# Patient Record
Sex: MALE | Race: BLACK OR AFRICAN AMERICAN | NOT HISPANIC OR LATINO | Employment: FULL TIME | URBAN - METROPOLITAN AREA
[De-identification: names, ages, dates, MRNs, and addresses within clinical notes are randomized per-mention and may not be internally consistent; named-entity substitution may affect disease eponyms.]

---

## 2019-05-27 ENCOUNTER — HOSPITAL ENCOUNTER (EMERGENCY)
Facility: HOSPITAL | Age: 49
Discharge: HOME | End: 2019-05-27
Attending: EMERGENCY MEDICINE
Payer: COMMERCIAL

## 2019-05-27 VITALS
HEIGHT: 72 IN | HEART RATE: 76 BPM | RESPIRATION RATE: 20 BRPM | BODY MASS INDEX: 33.86 KG/M2 | WEIGHT: 250 LBS | TEMPERATURE: 98 F | DIASTOLIC BLOOD PRESSURE: 92 MMHG | OXYGEN SATURATION: 98 % | SYSTOLIC BLOOD PRESSURE: 131 MMHG

## 2019-05-27 DIAGNOSIS — M54.9 RIGHT-SIDED BACK PAIN, UNSPECIFIED BACK LOCATION, UNSPECIFIED CHRONICITY: Primary | ICD-10-CM

## 2019-05-27 LAB
BILIRUB UR QL STRIP: NEGATIVE
CLARITY UR: CLEAR
COLOR UR: YELLOW
GLUCOSE UR QL STRIP: NEGATIVE
HGB UR QL STRIP: NEGATIVE
KETONES UR QL STRIP: NEGATIVE
LEUKOCYTE ESTERASE UR QL STRIP: NEGATIVE
NITRITE UR QL STRIP: NEGATIVE
PH UR STRIP: 6 [PH] (ref 5–8)
PROT UR QL STRIP: NEGATIVE
SP GR UR STRIP: 1.02 (ref 1–1.03)
URN SPEC COLLECT METH UR: NORMAL
UROBILINOGEN UR STRIP-ACNC: NEGATIVE EU/DL

## 2019-05-27 PROCEDURE — 25000003 PHARM REV CODE 250: Performed by: NURSE PRACTITIONER

## 2019-05-27 PROCEDURE — 99284 EMERGENCY DEPT VISIT MOD MDM: CPT

## 2019-05-27 PROCEDURE — 81003 URINALYSIS AUTO W/O SCOPE: CPT

## 2019-05-27 RX ORDER — METHOCARBAMOL 750 MG/1
1500 TABLET, FILM COATED ORAL
Status: COMPLETED | OUTPATIENT
Start: 2019-05-27 | End: 2019-05-27

## 2019-05-27 RX ORDER — KETOROLAC TROMETHAMINE 10 MG/1
10 TABLET, FILM COATED ORAL
Qty: 14 TABLET | Refills: 0 | Status: SHIPPED | OUTPATIENT
Start: 2019-05-27 | End: 2019-06-06

## 2019-05-27 RX ORDER — METHOCARBAMOL 500 MG/1
1000 TABLET, FILM COATED ORAL 3 TIMES DAILY
Qty: 30 TABLET | Refills: 0 | Status: SHIPPED | OUTPATIENT
Start: 2019-05-27 | End: 2019-06-01

## 2019-05-27 RX ORDER — MESALAMINE 1.2 G/1
1.2 TABLET, DELAYED RELEASE ORAL
COMMUNITY
End: 2022-06-03

## 2019-05-27 RX ORDER — KETOROLAC TROMETHAMINE 10 MG/1
10 TABLET, FILM COATED ORAL
Status: COMPLETED | OUTPATIENT
Start: 2019-05-27 | End: 2019-05-27

## 2019-05-27 RX ADMIN — KETOROLAC TROMETHAMINE 10 MG: 10 TABLET, FILM COATED ORAL at 10:05

## 2019-05-27 RX ADMIN — METHOCARBAMOL TABLETS 1500 MG: 750 TABLET, COATED ORAL at 10:05

## 2019-05-27 NOTE — DISCHARGE INSTRUCTIONS
Take prescribed medications as labeled as needed for pain.  Do not drive, drink alcohol, or operate machinery while taking Robaxin.  Follow up with [facility] in 2-3 days and return to ED for any concerns or worsening symptoms, such as fever, increased pain, or nonstop vomiting.

## 2019-05-27 NOTE — ED PROVIDER NOTES
Encounter Date: 5/27/2019       History     Chief Complaint   Patient presents with    Back Pain     48 year old male presents to ed cc of right back pain x 1 month patient denies fall or injury denies dysuria/ hematuria     Mingo Kahn 48 y.o.  with Past Medical History:  No date: Ulcerative (chronic) enterocolitis who presents to ED with intermittent low back pain x2 months that has worsened over the past 2 days.  Patient reports pain to the back on the right lower area. Denies any trauma or injury.  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  Denies any loss of bowel or bladder control.  No treatments tried.  Pt denies any alleviating factors. Denies fever, chills, neck pain/stiffness, weakness, numbness, tingling, dysuria, hematuria, or any other concerns at this time.     The history is provided by the patient.     Review of patient's allergies indicates:  No Known Allergies  Past Medical History:   Diagnosis Date    Ulcerative (chronic) enterocolitis      History reviewed. No pertinent surgical history.  History reviewed. No pertinent family history.  Social History     Tobacco Use    Smoking status: Current Every Day Smoker   Substance Use Topics    Alcohol use: Yes    Drug use: Not on file     Review of Systems   Constitutional: Negative for chills and fever.   Genitourinary: Negative for dysuria and hematuria.        Denies bowel/bladder incontinence.    Musculoskeletal: Positive for back pain. Negative for gait problem, neck pain and neck stiffness.   Neurological: Negative for weakness and numbness.   Hematological: Does not bruise/bleed easily.   All other systems reviewed and are negative.      Physical Exam     Initial Vitals [05/27/19 0939]   BP Pulse Resp Temp SpO2   (!) 131/92 76 20 98.4 °F (36.9 °C) 98 %      MAP       --         Physical Exam    Vitals reviewed.  Constitutional: He appears well-developed and well-nourished.  Non-toxic appearance. He  does not have a sickly appearance.   HENT:   Head: Atraumatic.   Eyes: EOM are normal.   Neck: Normal range of motion, full passive range of motion without pain and phonation normal. Neck supple.   Cardiovascular: Regular rhythm.   Pulmonary/Chest: No respiratory distress.   Musculoskeletal:        Back:    Non-tender midline CTL.  Sensation and strength intact in BLE.  Pain worse with forward flexion and extension.     Neurological: He is alert and oriented to person, place, and time. He has normal strength. No sensory deficit. Gait normal.   Ambulatory.   Skin: No rash noted.   Psychiatric: He has a normal mood and affect.         ED Course   Procedures  Labs Reviewed   URINALYSIS, REFLEX TO URINE CULTURE    Narrative:     Preferred Collection Type->Urine, Clean Catch          Imaging Results    None          Medical Decision Making:   History:   Old Medical Records: I decided to obtain old medical records.  Initial Assessment:   Pt presents to ED with  intermittent low back pain x2 months that has worsened over the past 2 days.  Appears well, non-toxic. Sensation and strength intact bilaterally in lower extremities.  Ambulatory.  No s/s of cauda equina. No mid-line tenderness upon exam, no need for imaging today. Will treat pain and reassess.   Clinical Tests:   Lab Tests: Reviewed and Ordered  The following lab test(s) were unremarkable: Urinalysis  ED Management:  UA, PO robaxin, toraol  UA shows no signs of infection or hematuria. No red flags on presentation or physical exam. Unlikely to be spinal stenosis as there are no neurologic findings, does not appear to be infectious given no fever, no point tenderness, coulg be DJD or disc herniation but xray normal and no red flags that would prompt any imaging. Likely musculoskeletal pain. I will d/c home with anti-inflammatories and muscle relaxer.  Narcotic precautions given.  Will instruct pt to follow up with PCP in 2-3 days if symptoms do not improve. We  disscused at length warning signs for return including but not limited to increased pain, change in gait, sensation changes around the rectum or perineum, bowel or bladder issues, fever and the pt understands. The pt is comfortable going home at this time. After taking into careful account the historical factors and physical exam findings of the patient's presentation today, in conjunction with the empirical and objective data obtained on ED workup, no acute emergent medical condition requiring admission has been identified. The patient appears to be low risk for an emergent medical condition and I feel it is safe and appropriate at this time for the patient to be discharged to follow-up as detailed in their discharge instructions for reevaluation and possible continued outpatient workup and management. I have discussed the specifics of the workup with the patient and the patient has verbalized understanding of the details of the workup, the diagnosis, the treatment plan, and the need for outpatient follow-up.  Although the patient has no emergent etiology today this does not preclude the development of an emergent condition so in addition, I have advised the patient that they can return to the ED and/or activate EMS at any time with worsening of their symptoms, change of their symptoms, or with any other medical complaint.  The patient remained comfortable and stable during their visit in the ED.  Discharge and follow-up instructions discussed with the patient who expressed understanding and willingness to comply with my recommendations.  Ambulatory referral to [facility] placed.     This medical record was prepared using voice dictation software. There may be phonetic errors.    Other:   I discussed test(s) with the performing physician.                   ED Course as of May 27 1512   Mon May 27, 2019   1031 10:31 AM- Patient reports improvement in pain after medications given in ED.      [CH]      ED  Course User Index  [CH] Romero Pinto NP     Clinical Impression:       ICD-10-CM ICD-9-CM   1. Right-sided back pain, unspecified back location, unspecified chronicity M54.9 724.5                                Romero Pinto NP  05/27/19 1516

## 2019-06-06 ENCOUNTER — OFFICE VISIT (OUTPATIENT)
Dept: FAMILY MEDICINE | Facility: CLINIC | Age: 49
End: 2019-06-06
Payer: COMMERCIAL

## 2019-06-06 VITALS
BODY MASS INDEX: 37.47 KG/M2 | SYSTOLIC BLOOD PRESSURE: 118 MMHG | HEART RATE: 62 BPM | HEIGHT: 72 IN | OXYGEN SATURATION: 98 % | WEIGHT: 276.69 LBS | DIASTOLIC BLOOD PRESSURE: 82 MMHG

## 2019-06-06 DIAGNOSIS — Z00.00 ANNUAL PHYSICAL EXAM: ICD-10-CM

## 2019-06-06 DIAGNOSIS — K51.80 OTHER ULCERATIVE COLITIS WITHOUT COMPLICATION: ICD-10-CM

## 2019-06-06 DIAGNOSIS — Z72.0 TOBACCO USE: ICD-10-CM

## 2019-06-06 DIAGNOSIS — M54.9 MID BACK PAIN ON RIGHT SIDE: Primary | ICD-10-CM

## 2019-06-06 PROBLEM — K51.90 ULCERATIVE COLITIS: Status: ACTIVE | Noted: 2018-06-21

## 2019-06-06 PROCEDURE — 99999 PR PBB SHADOW E&M-EST. PATIENT-LVL III: CPT | Mod: PBBFAC,,, | Performed by: FAMILY MEDICINE

## 2019-06-06 PROCEDURE — 90732 PNEUMOCOCCAL POLYSACCHARIDE VACCINE 23-VALENT =>2YO SQ IM: ICD-10-PCS | Mod: S$GLB,,, | Performed by: FAMILY MEDICINE

## 2019-06-06 PROCEDURE — 99204 PR OFFICE/OUTPT VISIT, NEW, LEVL IV, 45-59 MIN: ICD-10-PCS | Mod: 25,S$GLB,, | Performed by: FAMILY MEDICINE

## 2019-06-06 PROCEDURE — 90471 PNEUMOCOCCAL POLYSACCHARIDE VACCINE 23-VALENT =>2YO SQ IM: ICD-10-PCS | Mod: S$GLB,,, | Performed by: FAMILY MEDICINE

## 2019-06-06 PROCEDURE — 90471 IMMUNIZATION ADMIN: CPT | Mod: S$GLB,,, | Performed by: FAMILY MEDICINE

## 2019-06-06 PROCEDURE — 3008F PR BODY MASS INDEX (BMI) DOCUMENTED: ICD-10-PCS | Mod: CPTII,S$GLB,, | Performed by: FAMILY MEDICINE

## 2019-06-06 PROCEDURE — 3008F BODY MASS INDEX DOCD: CPT | Mod: CPTII,S$GLB,, | Performed by: FAMILY MEDICINE

## 2019-06-06 PROCEDURE — 90732 PPSV23 VACC 2 YRS+ SUBQ/IM: CPT | Mod: S$GLB,,, | Performed by: FAMILY MEDICINE

## 2019-06-06 PROCEDURE — 99999 PR PBB SHADOW E&M-EST. PATIENT-LVL III: ICD-10-PCS | Mod: PBBFAC,,, | Performed by: FAMILY MEDICINE

## 2019-06-06 PROCEDURE — 99204 OFFICE O/P NEW MOD 45 MIN: CPT | Mod: 25,S$GLB,, | Performed by: FAMILY MEDICINE

## 2019-06-06 NOTE — PROGRESS NOTES
Subjective:       Patient ID: Mingo Kahn is a 48 y.o. male.    Chief Complaint: Annual Exam and Establish Care    47 yo M pt, new to me, with PMH significant for ulcerative colitis presents for ED discharge f/u visit. He was evaluated on 5/27/19 with c/o right lower back pain--deemed to be MSK in nature. Tx'd with robaxin 1g BID + Ketorolac 10 mg TID with significant improvement in symptoms. He reports accidentally falling down stairs on 5/31/19 which worsened the pain. Denies fevers/chills, lower extremity radiculopathy, lower extremity sensory deficits, lower extremity weakness, bowel/bladder changes, weight loss, and skin changes to affected area. No OTC meds attempted.     Review of Systems   Constitutional: Negative for activity change, appetite change, chills, fever and unexpected weight change.   HENT: Negative for congestion, sneezing and sore throat.    Eyes: Negative for redness, itching and visual disturbance.   Respiratory: Negative for cough, shortness of breath and wheezing.    Cardiovascular: Negative for chest pain.   Gastrointestinal: Negative for abdominal pain, constipation, diarrhea, nausea and vomiting.   Genitourinary: Negative for difficulty urinating, discharge, dysuria, frequency and urgency.   Musculoskeletal: Positive for arthralgias and back pain.   Skin: Negative for rash.   Neurological: Negative for dizziness, syncope, weakness and headaches.   Psychiatric/Behavioral: Negative for dysphoric mood and suicidal ideas. The patient is not nervous/anxious.          Past Medical History:   Diagnosis Date    Diverticulitis     Ulcerative (chronic) enterocolitis        Past Surgical History:   Procedure Laterality Date    COLONOSCOPY W/ BIOPSIES  2015       Family History   Problem Relation Age of Onset    Hyperlipidemia Mother     Hypertension Mother     Cancer Father 71        Pancreatic Cancer    Stroke Maternal Grandmother     Heart attack Maternal Grandmother     Stomach  cancer Paternal Grandmother     Prostate cancer Maternal Uncle     Prostate cancer Maternal Uncle     Throat cancer Maternal Uncle     Stomach cancer Paternal Uncle     Breast cancer Paternal Aunt     Breast cancer Maternal Aunt        Social History     Tobacco Use   Smoking Status Current Every Day Smoker    Packs/day: 0.25    Years: 30.00    Pack years: 7.50       Social History     Social History Narrative    EtOH: 1-2 beers and 1-2 mixed drinks on weekends     Drug: None    Employment: Construction; industrial pelaez    Education: Some college (2 years)     ; Lives with wife     3 children        Objective:        Vitals:    06/06/19 1428   BP: 118/82   Pulse: 62   SpO2: 98%   Weight: 125.5 kg (276 lb 10.8 oz)   Height: 6' (1.829 m)       Physical Exam   Constitutional: He is oriented to person, place, and time. No distress.   Obeset; BMI 37.52   HENT:   Head: Normocephalic and atraumatic.   Right Ear: External ear normal.   Left Ear: External ear normal.   Mouth/Throat: Oropharynx is clear and moist and mucous membranes are normal.   Eyes: Conjunctivae and EOM are normal. No scleral icterus.   Neck: Normal range of motion.   Cardiovascular: Normal rate, regular rhythm and normal heart sounds. Exam reveals no gallop and no friction rub.   No murmur heard.  Pulmonary/Chest: Effort normal and breath sounds normal. No respiratory distress. He has no wheezes. He has no rales.   Musculoskeletal: Normal range of motion. He exhibits no edema, tenderness or deformity.        Back:    SLR negative bilaterally. FROM of spine on flexion/extension/twisting/tilting. + discomfort on twisting/tilting.    Neurological: He is alert and oriented to person, place, and time. No cranial nerve deficit. He exhibits normal muscle tone. Gait normal.   Skin: Skin is warm and dry. No rash noted. No erythema.   Psychiatric: He has a normal mood and affect. His behavior is normal.       Assessment:       1. Mid back pain  on right side    2. Chronic ulcerative enterocolitis without complication    3. Tobacco use    4. Annual physical exam        Plan:       Mingo was seen today for annual exam and establish care.    Diagnoses and all orders for this visit:    Mid back pain on right side    Chronic ulcerative enterocolitis without complication    Tobacco use  -     (In Office Administered) Pneumococcal Polysaccharide Vaccine (23 Valent) (SQ/IM)    Annual physical exam  -     CBC auto differential; Future  -     Comprehensive metabolic panel; Future  -     Hemoglobin A1c; Future  -     TSH; Future  -     Lipid panel; Future  -     HIV 1/2 Ag/Ab (4th Gen); Future  -     Vitamin D; Future    Mid-back pain; right   Muscular in nature. Mild to moderate in intensity   Recommend OTC NSAID/Tylenol prn  Warm compress prn  Massage prn  AAOS handout on spine conditioning provided  Advised activity as tolerated   Encouraged routine physical activity and abdominal core strengthening    Ulcerative Colitis   Dx'd 2015 after presenting with rectal bleeding   Followed by GI at [facility]   He is not adherent with Mesalamine.   Currently asymptomatic. Will continue to monitor  Due for repeat colonoscopy in 2020    Tobacco use   Precontemplative stage of quitting   Pneumovax-23 today   Discuss referral to tobacco cessation program on f/u visit       Tdap administered 6/13/17  PPSV-23 administered today 6/6/19    Anticipate f/u in 1-2 weeks for annual wellness visit. Plan for labs prior to visit   Patient also did express that he experiences intermittent shoulder pain. Will address this in further detail follow-up.

## 2019-06-15 ENCOUNTER — LAB VISIT (OUTPATIENT)
Dept: LAB | Facility: HOSPITAL | Age: 49
End: 2019-06-15
Attending: FAMILY MEDICINE
Payer: COMMERCIAL

## 2019-06-15 DIAGNOSIS — Z00.00 ANNUAL PHYSICAL EXAM: ICD-10-CM

## 2019-06-15 LAB
25(OH)D3+25(OH)D2 SERPL-MCNC: 14 NG/ML (ref 30–96)
ALBUMIN SERPL BCP-MCNC: 3.8 G/DL (ref 3.5–5.2)
ALP SERPL-CCNC: 104 U/L (ref 55–135)
ALT SERPL W/O P-5'-P-CCNC: 21 U/L (ref 10–44)
ANION GAP SERPL CALC-SCNC: 7 MMOL/L (ref 8–16)
AST SERPL-CCNC: 22 U/L (ref 10–40)
BASOPHILS # BLD AUTO: 0.03 K/UL (ref 0–0.2)
BASOPHILS NFR BLD: 0.7 % (ref 0–1.9)
BILIRUB SERPL-MCNC: 0.6 MG/DL (ref 0.1–1)
BUN SERPL-MCNC: 12 MG/DL (ref 6–20)
CALCIUM SERPL-MCNC: 9.5 MG/DL (ref 8.7–10.5)
CHLORIDE SERPL-SCNC: 108 MMOL/L (ref 95–110)
CHOLEST SERPL-MCNC: 133 MG/DL (ref 120–199)
CHOLEST/HDLC SERPL: 4.4 {RATIO} (ref 2–5)
CO2 SERPL-SCNC: 27 MMOL/L (ref 23–29)
CREAT SERPL-MCNC: 1.1 MG/DL (ref 0.5–1.4)
DIFFERENTIAL METHOD: ABNORMAL
EOSINOPHIL # BLD AUTO: 0.2 K/UL (ref 0–0.5)
EOSINOPHIL NFR BLD: 5.3 % (ref 0–8)
ERYTHROCYTE [DISTWIDTH] IN BLOOD BY AUTOMATED COUNT: 13.5 % (ref 11.5–14.5)
EST. GFR  (AFRICAN AMERICAN): >60 ML/MIN/1.73 M^2
EST. GFR  (NON AFRICAN AMERICAN): >60 ML/MIN/1.73 M^2
ESTIMATED AVG GLUCOSE: 134 MG/DL (ref 68–131)
GLUCOSE SERPL-MCNC: 104 MG/DL (ref 70–110)
HBA1C MFR BLD HPLC: 6.3 % (ref 4–5.6)
HCT VFR BLD AUTO: 47.5 % (ref 40–54)
HDLC SERPL-MCNC: 30 MG/DL (ref 40–75)
HDLC SERPL: 22.6 % (ref 20–50)
HGB BLD-MCNC: 15.1 G/DL (ref 14–18)
LDLC SERPL CALC-MCNC: 92.2 MG/DL (ref 63–159)
LYMPHOCYTES # BLD AUTO: 2.2 K/UL (ref 1–4.8)
LYMPHOCYTES NFR BLD: 49.9 % (ref 18–48)
MCH RBC QN AUTO: 26.9 PG (ref 27–31)
MCHC RBC AUTO-ENTMCNC: 31.8 G/DL (ref 32–36)
MCV RBC AUTO: 85 FL (ref 82–98)
MONOCYTES # BLD AUTO: 0.3 K/UL (ref 0.3–1)
MONOCYTES NFR BLD: 6.6 % (ref 4–15)
NEUTROPHILS # BLD AUTO: 1.6 K/UL (ref 1.8–7.7)
NEUTROPHILS NFR BLD: 37.5 % (ref 38–73)
NONHDLC SERPL-MCNC: 103 MG/DL
PLATELET # BLD AUTO: 208 K/UL (ref 150–350)
PMV BLD AUTO: 10.2 FL (ref 9.2–12.9)
POTASSIUM SERPL-SCNC: 4.5 MMOL/L (ref 3.5–5.1)
PROT SERPL-MCNC: 7.1 G/DL (ref 6–8.4)
RBC # BLD AUTO: 5.62 M/UL (ref 4.6–6.2)
SODIUM SERPL-SCNC: 142 MMOL/L (ref 136–145)
TRIGL SERPL-MCNC: 54 MG/DL (ref 30–150)
TSH SERPL DL<=0.005 MIU/L-ACNC: 0.56 UIU/ML (ref 0.4–4)
WBC # BLD AUTO: 4.37 K/UL (ref 3.9–12.7)

## 2019-06-15 PROCEDURE — 84443 ASSAY THYROID STIM HORMONE: CPT

## 2019-06-15 PROCEDURE — 82306 VITAMIN D 25 HYDROXY: CPT

## 2019-06-15 PROCEDURE — 80061 LIPID PANEL: CPT

## 2019-06-15 PROCEDURE — 36415 COLL VENOUS BLD VENIPUNCTURE: CPT

## 2019-06-15 PROCEDURE — 80053 COMPREHEN METABOLIC PANEL: CPT

## 2019-06-15 PROCEDURE — 86703 HIV-1/HIV-2 1 RESULT ANTBDY: CPT

## 2019-06-15 PROCEDURE — 83036 HEMOGLOBIN GLYCOSYLATED A1C: CPT

## 2019-06-15 PROCEDURE — 85025 COMPLETE CBC W/AUTO DIFF WBC: CPT

## 2019-06-17 LAB — HIV 1+2 AB+HIV1 P24 AG SERPL QL IA: NEGATIVE

## 2019-06-20 ENCOUNTER — TELEPHONE (OUTPATIENT)
Dept: FAMILY MEDICINE | Facility: CLINIC | Age: 49
End: 2019-06-20

## 2019-06-20 ENCOUNTER — LAB VISIT (OUTPATIENT)
Dept: LAB | Facility: HOSPITAL | Age: 49
End: 2019-06-20
Attending: FAMILY MEDICINE
Payer: COMMERCIAL

## 2019-06-20 ENCOUNTER — OFFICE VISIT (OUTPATIENT)
Dept: FAMILY MEDICINE | Facility: CLINIC | Age: 49
End: 2019-06-20
Payer: COMMERCIAL

## 2019-06-20 VITALS
OXYGEN SATURATION: 98 % | HEIGHT: 72 IN | WEIGHT: 271.38 LBS | DIASTOLIC BLOOD PRESSURE: 72 MMHG | SYSTOLIC BLOOD PRESSURE: 114 MMHG | BODY MASS INDEX: 36.76 KG/M2 | HEART RATE: 73 BPM

## 2019-06-20 DIAGNOSIS — E55.9 VITAMIN D DEFICIENCY: ICD-10-CM

## 2019-06-20 DIAGNOSIS — F17.200 TOBACCO DEPENDENCE: ICD-10-CM

## 2019-06-20 DIAGNOSIS — K51.80 OTHER ULCERATIVE COLITIS WITHOUT COMPLICATION: ICD-10-CM

## 2019-06-20 DIAGNOSIS — E78.6 LOW HDL (UNDER 40): ICD-10-CM

## 2019-06-20 DIAGNOSIS — M75.80 ROTATOR CUFF TENDINITIS, UNSPECIFIED LATERALITY: ICD-10-CM

## 2019-06-20 DIAGNOSIS — Z12.5 SCREENING PSA (PROSTATE SPECIFIC ANTIGEN): ICD-10-CM

## 2019-06-20 DIAGNOSIS — R73.03 PREDIABETES: ICD-10-CM

## 2019-06-20 DIAGNOSIS — Z00.00 ANNUAL PHYSICAL EXAM: Primary | ICD-10-CM

## 2019-06-20 LAB — COMPLEXED PSA SERPL-MCNC: 0.5 NG/ML (ref 0–4)

## 2019-06-20 PROCEDURE — 36415 COLL VENOUS BLD VENIPUNCTURE: CPT

## 2019-06-20 PROCEDURE — 84153 ASSAY OF PSA TOTAL: CPT

## 2019-06-20 PROCEDURE — 99999 PR PBB SHADOW E&M-EST. PATIENT-LVL III: ICD-10-PCS | Mod: PBBFAC,,, | Performed by: FAMILY MEDICINE

## 2019-06-20 PROCEDURE — 99999 PR PBB SHADOW E&M-EST. PATIENT-LVL III: CPT | Mod: PBBFAC,,, | Performed by: FAMILY MEDICINE

## 2019-06-20 PROCEDURE — 99396 PREV VISIT EST AGE 40-64: CPT | Mod: S$GLB,,, | Performed by: FAMILY MEDICINE

## 2019-06-20 PROCEDURE — 99396 PR PREVENTIVE VISIT,EST,40-64: ICD-10-PCS | Mod: S$GLB,,, | Performed by: FAMILY MEDICINE

## 2019-06-20 RX ORDER — ERGOCALCIFEROL 1.25 MG/1
50000 CAPSULE ORAL
Qty: 12 CAPSULE | Refills: 0 | Status: SHIPPED | OUTPATIENT
Start: 2019-06-20 | End: 2019-09-06

## 2019-06-20 NOTE — PROGRESS NOTES
Subjective:       Patient ID: Mingo Kahn is a 48 y.o. male.    Chief Complaint: Annual Exam    49 yo M, recently established pt of mine, with PMH significant for ulcerative colitis presents for annual physical exam.    BP: 114/72  BMI: 36.81  Diet/Lifestyle: First meal as at 11:30am--Turkey [place]; 7:30/8:00pm--Baked [place] vs Pork Chop. Rare Fruits/Vegetables. Started exercising 2 weeks ago-- push-ups/sit-ups/free weights  Last Eye Exam: 6/2018/wears rx'd glasses  Last Dental Exam: Several years ago   Last Colonoscopy: 2015; Dr. Eben Richmond ([facility]); planning for repeat 1/2019. He does have UC that is in remission without use of medication.   Last PSA: Has not had  Last Flu Vaccine: N/A  Last Tdap Vaccine: 6/3/17    Labs drawn 6/15/19:   A1c 6.3  , TG 54, HDL 30, LDL 92.2; 10 yr ASCVD risk 11.10%; would be 5.93% if no tobacco use.   Vitamin D 14  HIV NR  CBC wnl   CMP wnl  TSH 0.559    Review of Systems   Constitutional: Negative for activity change, appetite change, chills, fever and unexpected weight change.   HENT: Negative for congestion, sneezing and sore throat.    Eyes: Negative for redness, itching and visual disturbance.   Respiratory: Negative for cough, shortness of breath and wheezing.    Cardiovascular: Negative for chest pain.   Gastrointestinal: Negative for anal bleeding, constipation, diarrhea, nausea and vomiting.   Genitourinary: Negative for difficulty urinating, discharge, dysuria, frequency and urgency.   Musculoskeletal: Positive for arthralgias (bilateral shoulders).   Skin: Negative for rash.   Neurological: Negative for dizziness, syncope, weakness and headaches.   Psychiatric/Behavioral: Negative for dysphoric mood and suicidal ideas. The patient is not nervous/anxious.          Past Medical History:   Diagnosis Date    Diverticulitis     Ulcerative (chronic) enterocolitis        Patient Active Problem List   Diagnosis    Ulcerative colitis    BMI 35.0-35.9,adult     Vitamin D deficiency    Prediabetes    Low HDL (under 40)    Tobacco dependence    Rotator cuff tendinitis       Past Surgical History:   Procedure Laterality Date    COLONOSCOPY W/ BIOPSIES  2015       Family History   Problem Relation Age of Onset    Hyperlipidemia Mother     Hypertension Mother     Cancer Father 71        Pancreatic Cancer    Stroke Maternal Grandmother     Heart attack Maternal Grandmother     Stomach cancer Paternal Grandmother     Prostate cancer Maternal Uncle     Prostate cancer Maternal Uncle     Throat cancer Maternal Uncle     Stomach cancer Paternal Uncle     Breast cancer Paternal Aunt     Breast cancer Maternal Aunt        Social History     Tobacco Use   Smoking Status Current Every Day Smoker    Packs/day: 0.25    Years: 30.00    Pack years: 7.50       Social History     Social History Narrative    EtOH: 1-2 beers and 1-2 mixed drinks on weekends     Drug: None    Employment: Construction; industrial pelaez    Education: Some college (2 years)     ; Lives with wife     3 children        Objective:        Vitals:    06/20/19 0748   BP: 114/72   Pulse: 73   SpO2: 98%   Weight: 123.1 kg (271 lb 6.2 oz)   Height: 6' (1.829 m)       Physical Exam   Constitutional: He is oriented to person, place, and time. He appears well-developed and well-nourished. No distress.   HENT:   Head: Normocephalic and atraumatic.   Right Ear: Tympanic membrane, external ear and ear canal normal.   Left Ear: Tympanic membrane, external ear and ear canal normal.   Nose: Nose normal. No mucosal edema or rhinorrhea. Right sinus exhibits no maxillary sinus tenderness and no frontal sinus tenderness. Left sinus exhibits no maxillary sinus tenderness and no frontal sinus tenderness.   Mouth/Throat: Oropharynx is clear and moist and mucous membranes are normal. No oropharyngeal exudate.   Eyes: Conjunctivae and EOM are normal. No scleral icterus.   Neck: Normal range of motion. Neck  supple. No thyromegaly present.   Cardiovascular: Normal rate, regular rhythm and normal heart sounds. Exam reveals no gallop and no friction rub.   No murmur heard.  Pulses:       Dorsalis pedis pulses are 2+ on the right side, and 2+ on the left side.   Pulmonary/Chest: Effort normal and breath sounds normal. He has no wheezes. He has no rales.   Abdominal: Soft. Bowel sounds are normal. He exhibits no distension and no mass. There is no tenderness.   Musculoskeletal: Normal range of motion. He exhibits no edema or deformity.   Lymphadenopathy:     He has no cervical adenopathy.   Neurological: He is alert and oriented to person, place, and time. He has normal strength. No cranial nerve deficit or sensory deficit.   Reflex Scores:       Patellar reflexes are 2+ on the right side and 2+ on the left side.  Skin: Skin is warm and dry. No rash noted. No erythema.   Psychiatric: He has a normal mood and affect. His behavior is normal.         Assessment:       1. Annual physical exam    2. BMI 35.0-35.9,adult    3. Prediabetes    4. Low HDL (under 40)    5. Vitamin D deficiency    6. Other ulcerative colitis without complication    7. Tobacco dependence    8. Rotator cuff tendinitis, unspecified laterality    9. Screening PSA (prostate specific antigen)        Plan:       Mingo was seen today for annual exam.    Diagnoses and all orders for this visit:    Annual physical exam    BMI 35.0-35.9,adult    Prediabetes    Low HDL (under 40)    Vitamin D deficiency  -     ergocalciferol (ERGOCALCIFEROL) 50,000 unit Cap; Take 1 capsule (50,000 Units total) by mouth every 7 days. for 12 doses    Other ulcerative colitis without complication    Tobacco dependence    Rotator cuff tendinitis, unspecified laterality    Screening PSA (prostate specific antigen)  -     PSA, Screening; Future      Annual Exam  BP normotensive  BMI: 36.81--see below  Encouraged annual eye exams and adherence with rx'd lenses. Encouraged dental exams  q6 months  PSA today given family history of prostate cancer (2 maternal uncles dx'd at 59 yo)  Last Flu Vaccine: N/A  Last Tdap Vaccine: 6/3/17  HIV NR 6/15/19  CBC wnl 6/15/19  CMP wnl 6/15/19  TSH 0.559 6/15/19    Morbidly Obese   Advised at least 30 min of CV exercise 5 days a week. Encouraged plant-based diet. Advised small/frequent meals.  Also advised avoidance of sweetened beverages, limit portion sizes, and discussed healthy carbohydrate options (brown rice, 100% whole wheat bread, wheat pasta)      Abnormal Glc  A1c 6.3 6/15/19  Discussed healthy diet/lifestyle modifications as above; weight loss encouraged    Low HDL   , TG 54, HDL 30, LDL 92.2 6/15/19; 10 yr ASCVD risk 11.10%; would be 5.93% if no tobacco use  Advised tobacco cessation.     Vitamin D deficiency  Vitamin D 14 6/15/19  Will replete with ergocalciferol 50K U qweek x 12 weeks   Repeat Vitamin D in 3 months    UC   Currently in remission--untreated   Last Colonoscopy: 2015; Dr. Eben Richmond ([facility]); planning for repeat 1/2019    Tobacco Use   Currently working to cut down on his own   Declines referral for tobacco cessation today  Pneumovax-23 administered 6/6/2019    At the end of the visit pt wanted to address intermittent bilateral shoulder pain x several months. Reports shoulders give out on him with abduction. Exam notable for Full PROM and Full AROM. + empty can test and Hawkin's test bilaterally. 5/5 strength equal and intact to BUEs. Symptoms and complaint c/w rotator cuff tendinopathy. Pt has opted for home strengthening exercises of shoulder and rotator cuff as opposed to PT. Advised OTC tylenol/nsaid prn.      Follow up in about 3 months (around 9/20/2019).

## 2019-06-20 NOTE — TELEPHONE ENCOUNTER
----- Message from Natalie Price sent at 6/20/2019  2:09 PM CDT -----  Contact: [phone]/self  Patient called in returning your call. Please advise.

## 2019-06-20 NOTE — TELEPHONE ENCOUNTER
Called patient to notify that his prostate testing was normal. This should be repeated in 1 year.  No answer, left voicemail.

## 2019-09-24 ENCOUNTER — OFFICE VISIT (OUTPATIENT)
Dept: FAMILY MEDICINE | Facility: CLINIC | Age: 49
End: 2019-09-24
Payer: COMMERCIAL

## 2019-09-24 ENCOUNTER — LAB VISIT (OUTPATIENT)
Dept: LAB | Facility: HOSPITAL | Age: 49
End: 2019-09-24
Attending: FAMILY MEDICINE
Payer: COMMERCIAL

## 2019-09-24 VITALS
SYSTOLIC BLOOD PRESSURE: 124 MMHG | HEART RATE: 82 BPM | DIASTOLIC BLOOD PRESSURE: 76 MMHG | BODY MASS INDEX: 36.37 KG/M2 | OXYGEN SATURATION: 98 % | HEIGHT: 72 IN | WEIGHT: 268.5 LBS

## 2019-09-24 DIAGNOSIS — E78.6 LOW HDL (UNDER 40): ICD-10-CM

## 2019-09-24 DIAGNOSIS — R73.03 PREDIABETES: ICD-10-CM

## 2019-09-24 DIAGNOSIS — E55.9 VITAMIN D DEFICIENCY: Primary | ICD-10-CM

## 2019-09-24 DIAGNOSIS — E55.9 VITAMIN D DEFICIENCY: ICD-10-CM

## 2019-09-24 DIAGNOSIS — K51.80 OTHER ULCERATIVE COLITIS WITHOUT COMPLICATION: ICD-10-CM

## 2019-09-24 DIAGNOSIS — F17.200 TOBACCO DEPENDENCE: ICD-10-CM

## 2019-09-24 DIAGNOSIS — Z00.00 ANNUAL PHYSICAL EXAM: ICD-10-CM

## 2019-09-24 PROBLEM — M75.80 ROTATOR CUFF TENDINITIS: Status: RESOLVED | Noted: 2019-06-20 | Resolved: 2019-09-24

## 2019-09-24 LAB — 25(OH)D3+25(OH)D2 SERPL-MCNC: 19 NG/ML (ref 30–96)

## 2019-09-24 PROCEDURE — 99214 PR OFFICE/OUTPT VISIT, EST, LEVL IV, 30-39 MIN: ICD-10-PCS | Mod: S$GLB,,, | Performed by: FAMILY MEDICINE

## 2019-09-24 PROCEDURE — 99999 PR PBB SHADOW E&M-EST. PATIENT-LVL III: ICD-10-PCS | Mod: PBBFAC,,, | Performed by: FAMILY MEDICINE

## 2019-09-24 PROCEDURE — 3008F BODY MASS INDEX DOCD: CPT | Mod: CPTII,S$GLB,, | Performed by: FAMILY MEDICINE

## 2019-09-24 PROCEDURE — 99999 PR PBB SHADOW E&M-EST. PATIENT-LVL III: CPT | Mod: PBBFAC,,, | Performed by: FAMILY MEDICINE

## 2019-09-24 PROCEDURE — 82306 VITAMIN D 25 HYDROXY: CPT

## 2019-09-24 PROCEDURE — 99214 OFFICE O/P EST MOD 30 MIN: CPT | Mod: S$GLB,,, | Performed by: FAMILY MEDICINE

## 2019-09-24 PROCEDURE — 36415 COLL VENOUS BLD VENIPUNCTURE: CPT

## 2019-09-24 PROCEDURE — 3008F PR BODY MASS INDEX (BMI) DOCUMENTED: ICD-10-PCS | Mod: CPTII,S$GLB,, | Performed by: FAMILY MEDICINE

## 2019-09-24 RX ORDER — VIT C/E/ZN/COPPR/LUTEIN/ZEAXAN 250MG-90MG
1000 CAPSULE ORAL DAILY
Refills: 0 | COMMUNITY
Start: 2019-09-24 | End: 2022-03-23 | Stop reason: ALTCHOICE

## 2019-09-24 NOTE — PROGRESS NOTES
Subjective:       Patient ID: Mingo Kahn is a 49 y.o. male.    Chief Complaint: Follow-up    47 yo M, recently established pt of mine, with PMH significant for ulcerative colitis, Vitamin D deficiency, and Tobacco use. He presents for a follow-up office visit.     -He completed a 12 week course of high dose Vitamin D after Vitamin D level noted to be 14 6/2019. He has not been taking OTC Vitamin D since completion of high dose tabs.     -He is now smoking about 1-2 cigarettes daily (down from 5-6 cigarettes daily).     -He has lost 3 lbs over the past 3 months.     -He is no longer experiencing bilateral shoulder pain after performing home shoulder conditioning exercises.      -He did have a f/u visit with GI (Dr. Richmond) on 8/26/19. States GI was attempting to get previous lab results.     Review of Systems   Constitutional: Negative for activity change, appetite change, chills, fever and unexpected weight change.   HENT: Negative for congestion, sneezing and sore throat.    Eyes: Negative for redness, itching and visual disturbance.   Respiratory: Negative for cough, shortness of breath and wheezing.    Cardiovascular: Negative for chest pain.   Gastrointestinal: Negative for abdominal pain, constipation, diarrhea, nausea and vomiting.   Genitourinary: Negative for difficulty urinating, discharge, dysuria, frequency and urgency.   Skin: Negative for rash.   Neurological: Negative for dizziness, syncope, weakness and headaches.   Psychiatric/Behavioral: Negative for dysphoric mood and suicidal ideas. The patient is not nervous/anxious.          Past Medical History:   Diagnosis Date    Diverticulitis     Ulcerative (chronic) enterocolitis        Patient Active Problem List   Diagnosis    Ulcerative colitis    BMI 36.0-36.9,adult    Vitamin D deficiency    Prediabetes    Low HDL (under 40)    Tobacco dependence       Past Surgical History:   Procedure Laterality Date    COLONOSCOPY W/ BIOPSIES  2015        Family History   Problem Relation Age of Onset    Hyperlipidemia Mother     Hypertension Mother     Cancer Father 71        Pancreatic Cancer    Stroke Maternal Grandmother     Heart attack Maternal Grandmother     Stomach cancer Paternal Grandmother     Prostate cancer Maternal Uncle     Prostate cancer Maternal Uncle     Throat cancer Maternal Uncle     Stomach cancer Paternal Uncle     Breast cancer Paternal Aunt     Breast cancer Maternal Aunt        Social History     Tobacco Use   Smoking Status Current Every Day Smoker    Packs/day: 0.25    Years: 30.00    Pack years: 7.50       Social History     Social History Narrative    EtOH: 1-2 beers and 1-2 mixed drinks on weekends     Drug: None    Employment: Construction; industrial [employer]    Education: Some college (2 years)     ; Lives with wife     3 children        Objective:        Vitals:    09/24/19 0900   BP: 124/76   Pulse: 82   SpO2: 98%   Weight: 121.8 kg (268 lb 8.3 oz)   Height: 6' (1.829 m)       Physical Exam   Constitutional: He is oriented to person, place, and time. He appears well-developed and well-nourished. No distress.   HENT:   Head: Normocephalic and atraumatic.   Right Ear: External Ear normal   Left Ear: External Ear normal.   Nose: Nose normal. No mucosal edema or rhinorrhea. Right sinus exhibits no maxillary sinus tenderness and no frontal sinus tenderness. Left sinus exhibits no maxillary sinus tenderness and no frontal sinus tenderness.   Mouth/Throat: Oropharynx is clear and moist and mucous membranes are normal. No oropharyngeal exudate.   Eyes: Conjunctivae and EOM are normal. No scleral icterus.   Neck: Normal range of motion. Neck supple. No thyromegaly present.   Cardiovascular: Normal rate, regular rhythm and normal heart sounds. Exam reveals no gallop and no friction rub.   No murmur heard.  Pulmonary/Chest: Effort normal and breath sounds normal. He has no wheezes. He has no rales.    Musculoskeletal: Normal range of motion. He exhibits no edema or deformity.   Neurological: He is alert and oriented to person, place, and time.   Skin: Skin is warm and dry. No rash noted. No erythema.   Psychiatric: He has a normal mood and affect. His behavior is normal.     Assessment:       1. Vitamin D deficiency    2. Tobacco dependence    3. Other ulcerative colitis without complication    4. BMI 36.0-36.9,adult    5. Prediabetes    6. Low HDL (under 40)    7. Annual physical exam        Plan:       Mingo was seen today for follow-up.    Diagnoses and all orders for this visit:    Vitamin D deficiency  -     Vitamin D; Future  -     cholecalciferol, vitamin D3, (VITAMIN D3) 1,000 unit capsule; Take 1 capsule (1,000 Units total) by mouth once daily.    Tobacco dependence    Other ulcerative colitis without complication    BMI 36.0-36.9,adult    Prediabetes    Low HDL (under 40)    Annual physical exam  -     CBC auto differential; Future  -     Comprehensive metabolic panel; Future  -     Hemoglobin A1c; Future  -     Lipid panel; Future  -     Cancel: PSA, Screening; Future  -     PSA, Screening; Future      Vitamin D deficiency  Vitamin D 14 6/15/19  Completed ergocalciferol 50K U qweek x 12 weeks   Repeat Vitamin D today   Advised OTC Vitamin D3 1000 IU daily     Tobacco Use   Currently working to cut down on his own (now at 1-2 cigs daily)   Previously declined referral for tobacco cessation   Pneumovax-23 administered 6/6/2019    Shoulder Pain  --most c/w bilateral rotator cuff tendinopathy based on previous exam  Resolved with home strengthening exercises of shoulder and rotator cuff.     UC   Currently in remission--untreated   Most recent GI visit 8/26/19 at [facility]  Last Colonoscopy: 2015; Dr. Eben Richmond ([facility]); planning for repeat 1/2019  Labs printed for pt      Morbidly Obese   BMI 36.42  Advised at least 30 min of CV exercise 5 days a week. Encouraged plant-based diet. Advised small/frequent  meals.  Also advised avoidance of sweetened beverages, limit portion sizes, and discussed healthy carbohydrate options (brown rice, 100% whole wheat bread, wheat pasta)      Abnormal Glc  A1c 6.3 6/15/19  Discussed healthy diet/lifestyle modifications as above; weight loss encouraged    Low HDL   , TG 54, HDL 30, LDL 92.2 6/15/19; 10 yr ASCVD risk 11.10%; would be 5.93% if no tobacco use  Advised tobacco cessation.        HM  BP normotensive  BMI: 36.42  Encouraged annual eye exams and adherence with rx'd lenses. Encouraged dental exams q6 months  PSA 0.50 6/20/19. Plan for annual screen given family history of prostate cancer (2 maternal uncles dx'd at 59 yo)  Flu vaccine declined today  Last Tdap Vaccine: 6/3/17  HIV NR 6/15/19  CBC wnl 6/15/19  CMP wnl 6/15/19  TSH 0.559 6/15/19    Follow up in about 8 months (around 6/1/2020).

## 2019-09-29 ENCOUNTER — TELEPHONE (OUTPATIENT)
Dept: FAMILY MEDICINE | Facility: CLINIC | Age: 49
End: 2019-09-29

## 2019-09-30 ENCOUNTER — TELEPHONE (OUTPATIENT)
Dept: FAMILY MEDICINE | Facility: CLINIC | Age: 49
End: 2019-09-30

## 2019-09-30 NOTE — TELEPHONE ENCOUNTER
Left message requesting a callback to review the following:      Please let patient know that Vitamin D level is still a little low at 19. I would actually like for him to take Vitamin D3 4824-7990 units once daily.

## 2020-11-20 LAB — HM COLONOSCOPY: NORMAL

## 2021-06-23 ENCOUNTER — PATIENT OUTREACH (OUTPATIENT)
Dept: ADMINISTRATIVE | Facility: HOSPITAL | Age: 51
End: 2021-06-23

## 2021-08-25 DIAGNOSIS — Z11.59 NEED FOR HEPATITIS C SCREENING TEST: ICD-10-CM

## 2022-02-04 ENCOUNTER — PATIENT OUTREACH (OUTPATIENT)
Dept: ADMINISTRATIVE | Facility: HOSPITAL | Age: 52
End: 2022-02-04
Payer: COMMERCIAL

## 2022-03-21 ENCOUNTER — LAB VISIT (OUTPATIENT)
Dept: PRIMARY CARE CLINIC | Facility: CLINIC | Age: 52
End: 2022-03-21
Payer: COMMERCIAL

## 2022-03-21 ENCOUNTER — OFFICE VISIT (OUTPATIENT)
Dept: PRIMARY CARE CLINIC | Facility: CLINIC | Age: 52
End: 2022-03-21
Payer: COMMERCIAL

## 2022-03-21 VITALS
RESPIRATION RATE: 18 BRPM | SYSTOLIC BLOOD PRESSURE: 110 MMHG | HEART RATE: 82 BPM | DIASTOLIC BLOOD PRESSURE: 69 MMHG | BODY MASS INDEX: 38.54 KG/M2 | HEIGHT: 72 IN | TEMPERATURE: 98 F | WEIGHT: 284.5 LBS | OXYGEN SATURATION: 98 %

## 2022-03-21 DIAGNOSIS — G89.29 CHRONIC RIGHT-SIDED THORACIC BACK PAIN: Primary | ICD-10-CM

## 2022-03-21 DIAGNOSIS — E55.9 VITAMIN D DEFICIENCY: ICD-10-CM

## 2022-03-21 DIAGNOSIS — E78.6 LOW HDL (UNDER 40): ICD-10-CM

## 2022-03-21 DIAGNOSIS — Z23 NEED FOR SHINGLES VACCINE: ICD-10-CM

## 2022-03-21 DIAGNOSIS — E66.9 CLASS 2 OBESITY WITHOUT SERIOUS COMORBIDITY WITH BODY MASS INDEX (BMI) OF 38.0 TO 38.9 IN ADULT, UNSPECIFIED OBESITY TYPE: ICD-10-CM

## 2022-03-21 DIAGNOSIS — M54.6 CHRONIC RIGHT-SIDED THORACIC BACK PAIN: Primary | ICD-10-CM

## 2022-03-21 DIAGNOSIS — F17.200 TOBACCO DEPENDENCE: ICD-10-CM

## 2022-03-21 DIAGNOSIS — R73.03 PREDIABETES: ICD-10-CM

## 2022-03-21 DIAGNOSIS — Z00.00 ANNUAL PHYSICAL EXAM: ICD-10-CM

## 2022-03-21 DIAGNOSIS — K51.80 OTHER ULCERATIVE COLITIS WITHOUT COMPLICATION: ICD-10-CM

## 2022-03-21 DIAGNOSIS — R10.11 RUQ PAIN: ICD-10-CM

## 2022-03-21 PROBLEM — E66.812 CLASS 2 OBESITY WITHOUT SERIOUS COMORBIDITY WITH BODY MASS INDEX (BMI) OF 38.0 TO 38.9 IN ADULT: Status: ACTIVE | Noted: 2022-03-21

## 2022-03-21 LAB
25(OH)D3+25(OH)D2 SERPL-MCNC: 10 NG/ML (ref 30–96)
ALBUMIN SERPL BCP-MCNC: 3.7 G/DL (ref 3.5–5.2)
ALP SERPL-CCNC: 129 U/L (ref 55–135)
ALT SERPL W/O P-5'-P-CCNC: 25 U/L (ref 10–44)
ANION GAP SERPL CALC-SCNC: 11 MMOL/L (ref 8–16)
AST SERPL-CCNC: 31 U/L (ref 10–40)
BASOPHILS # BLD AUTO: 0.06 K/UL (ref 0–0.2)
BASOPHILS NFR BLD: 1.1 % (ref 0–1.9)
BILIRUB SERPL-MCNC: 0.4 MG/DL (ref 0.1–1)
BUN SERPL-MCNC: 11 MG/DL (ref 6–20)
CALCIUM SERPL-MCNC: 9.9 MG/DL (ref 8.7–10.5)
CHLORIDE SERPL-SCNC: 104 MMOL/L (ref 95–110)
CHOLEST SERPL-MCNC: 135 MG/DL (ref 120–199)
CHOLEST/HDLC SERPL: 3.6 {RATIO} (ref 2–5)
CO2 SERPL-SCNC: 27 MMOL/L (ref 23–29)
CREAT SERPL-MCNC: 1.2 MG/DL (ref 0.5–1.4)
DIFFERENTIAL METHOD: NORMAL
EOSINOPHIL # BLD AUTO: 0.4 K/UL (ref 0–0.5)
EOSINOPHIL NFR BLD: 6.3 % (ref 0–8)
ERYTHROCYTE [DISTWIDTH] IN BLOOD BY AUTOMATED COUNT: 13.7 % (ref 11.5–14.5)
EST. GFR  (AFRICAN AMERICAN): >60 ML/MIN/1.73 M^2
EST. GFR  (NON AFRICAN AMERICAN): >60 ML/MIN/1.73 M^2
ESTIMATED AVG GLUCOSE: 151 MG/DL (ref 68–131)
GLUCOSE SERPL-MCNC: 148 MG/DL (ref 70–110)
HBA1C MFR BLD: 6.9 % (ref 4–5.6)
HCT VFR BLD AUTO: 49.3 % (ref 40–54)
HDLC SERPL-MCNC: 37 MG/DL (ref 40–75)
HDLC SERPL: 27.4 % (ref 20–50)
HGB BLD-MCNC: 15.8 G/DL (ref 14–18)
IMM GRANULOCYTES # BLD AUTO: 0.02 K/UL (ref 0–0.04)
IMM GRANULOCYTES NFR BLD AUTO: 0.4 % (ref 0–0.5)
LDLC SERPL CALC-MCNC: 71.6 MG/DL (ref 63–159)
LYMPHOCYTES # BLD AUTO: 2.3 K/UL (ref 1–4.8)
LYMPHOCYTES NFR BLD: 42 % (ref 18–48)
MCH RBC QN AUTO: 27.6 PG (ref 27–31)
MCHC RBC AUTO-ENTMCNC: 32 G/DL (ref 32–36)
MCV RBC AUTO: 86 FL (ref 82–98)
MONOCYTES # BLD AUTO: 0.4 K/UL (ref 0.3–1)
MONOCYTES NFR BLD: 6.8 % (ref 4–15)
NEUTROPHILS # BLD AUTO: 2.4 K/UL (ref 1.8–7.7)
NEUTROPHILS NFR BLD: 43.4 % (ref 38–73)
NONHDLC SERPL-MCNC: 98 MG/DL
NRBC BLD-RTO: 0 /100 WBC
PLATELET # BLD AUTO: 238 K/UL (ref 150–450)
PMV BLD AUTO: 11.4 FL (ref 9.2–12.9)
POTASSIUM SERPL-SCNC: 4 MMOL/L (ref 3.5–5.1)
PROT SERPL-MCNC: 7.1 G/DL (ref 6–8.4)
RBC # BLD AUTO: 5.72 M/UL (ref 4.6–6.2)
SODIUM SERPL-SCNC: 142 MMOL/L (ref 136–145)
TRIGL SERPL-MCNC: 132 MG/DL (ref 30–150)
WBC # BLD AUTO: 5.55 K/UL (ref 3.9–12.7)

## 2022-03-21 PROCEDURE — 36415 COLL VENOUS BLD VENIPUNCTURE: CPT | Mod: S$GLB,,, | Performed by: FAMILY MEDICINE

## 2022-03-21 PROCEDURE — 99999 PR PBB SHADOW E&M-EST. PATIENT-LVL IV: ICD-10-PCS | Mod: PBBFAC,,, | Performed by: FAMILY MEDICINE

## 2022-03-21 PROCEDURE — 99214 OFFICE O/P EST MOD 30 MIN: CPT | Mod: S$GLB,,, | Performed by: FAMILY MEDICINE

## 2022-03-21 PROCEDURE — 99999 PR PBB SHADOW E&M-EST. PATIENT-LVL IV: CPT | Mod: PBBFAC,,, | Performed by: FAMILY MEDICINE

## 2022-03-21 PROCEDURE — 80053 COMPREHEN METABOLIC PANEL: CPT | Performed by: FAMILY MEDICINE

## 2022-03-21 PROCEDURE — 82306 VITAMIN D 25 HYDROXY: CPT | Performed by: FAMILY MEDICINE

## 2022-03-21 PROCEDURE — 36415 PR COLLECTION VENOUS BLOOD,VENIPUNCTURE: ICD-10-PCS | Mod: S$GLB,,, | Performed by: FAMILY MEDICINE

## 2022-03-21 PROCEDURE — 83036 HEMOGLOBIN GLYCOSYLATED A1C: CPT | Performed by: FAMILY MEDICINE

## 2022-03-21 PROCEDURE — 99214 PR OFFICE/OUTPT VISIT, EST, LEVL IV, 30-39 MIN: ICD-10-PCS | Mod: S$GLB,,, | Performed by: FAMILY MEDICINE

## 2022-03-21 PROCEDURE — 86803 HEPATITIS C AB TEST: CPT | Performed by: FAMILY MEDICINE

## 2022-03-21 PROCEDURE — 85025 COMPLETE CBC W/AUTO DIFF WBC: CPT | Performed by: FAMILY MEDICINE

## 2022-03-21 PROCEDURE — 80061 LIPID PANEL: CPT | Performed by: FAMILY MEDICINE

## 2022-03-21 RX ORDER — ZOSTER VACCINE RECOMBINANT, ADJUVANTED 50 MCG/0.5
0.5 KIT INTRAMUSCULAR ONCE
Qty: 1 EACH | Refills: 0 | Status: SHIPPED | OUTPATIENT
Start: 2022-03-21 | End: 2022-03-21

## 2022-03-21 NOTE — PROGRESS NOTES
Subjective:       Patient ID: Mingo Kahn is a 51 y.o. male.    Chief Complaint: Annual Exam    47 yo M, established pt of mine (last visit 09/2019), with PMH significant for ulcerative colitis, Vitamin D deficiency, and Tobacco use. He presents today with c/o right flank pain x 1 year. States that he woke up with pain; thought it was muscle strain. Pain is intermittent; coming/going over past year; describes as pulling.  Pain is 8/10 when present. Pain resolves with position changes. No h/o back trauma, heavy lifting, injury to back or abdomen. Sometimes has sensation of bulge in front. No nausea, vomiting, diarrhea, constipation, fevers, or chills. No OTC meds. Worse with bending forward; sensation of stretching. Also worsened with lying in prone positions--described as pressure to abdomen and back.    -UC remains in remission.    -He is not taking OTC Vitamn D     -He continues to smoke about 1-2 cigarettes daily (down from 5-6 cigarettes daily). Able to briefly quit after last visit, but re-started after wife passed away 1 year ago.    BP: 110/69  BMI: Body mass index is 38.59 kg/m². 24lb weight gain  Diet/Lifestyle: First meal as at 11:30am--Turkey [place]; 7:30/8:00pm--Baked [place] vs Pork Chop. Has Fruits routinely. Some Vegetables (broccoli, asparagus) Mainly eating fish/turkey. Lots of carbs.  Not exercising.  Last Eye Exam: 03/2021--wears rx'd glasses. Plans to schedule  Last Dental Exam: 2019  Last Colonoscopy: 11/20/2020; Dr. Eben Garcia ([facility]); planning for repeat 10 years. He does have UC that is in remission without use of medication.   Last PSA: Has not had  Last Flu Vaccine: N/A  Last Tdap Vaccine: 6/3/17  Has not had flu vaccine   Completed COVID-19 primary vaccine series only.      Past Medical History:   Diagnosis Date    Diverticulitis     Ulcerative (chronic) enterocolitis        Patient Active Problem List   Diagnosis    Ulcerative colitis    BMI 36.0-36.9,adult     Vitamin D deficiency    Prediabetes    Low HDL (under 40)    Tobacco dependence    Class 2 obesity without serious comorbidity with body mass index (BMI) of 38.0 to 38.9 in adult       Past Surgical History:   Procedure Laterality Date    COLONOSCOPY W/ BIOPSIES  2015       Family History   Problem Relation Age of Onset    Hyperlipidemia Mother     Hypertension Mother     Cancer Father 71        Pancreatic Cancer    Stroke Maternal Grandmother     Heart attack Maternal Grandmother     Stomach cancer Paternal Grandmother     Prostate cancer Maternal Uncle     Prostate cancer Maternal Uncle     Throat cancer Maternal Uncle     Stomach cancer Paternal Uncle     Breast cancer Paternal Aunt     Breast cancer Maternal Aunt        Social History     Tobacco Use   Smoking Status Current Every Day Smoker    Packs/day: 0.25    Years: 30.00    Pack years: 7.50   Smokeless Tobacco Never Used       Social History     Social History Narrative    EtOH: 1-2 beers and 1-2 mixed drinks on weekends     Drug: None    Employment: Construction; 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 Wife passed 2021    3 children        Medications have been reviewed and reconciled.     Review of patient's allergies indicates:  No Known Allergies     Review of Systems   Constitutional: Negative for activity change, appetite change, chills, fever and unexpected weight change.   HENT: Negative for congestion, sneezing and sore throat.    Eyes: Negative for redness, itching and visual disturbance.   Respiratory: Negative for cough, shortness of breath and wheezing.    Cardiovascular: Negative for chest pain.   Gastrointestinal: Positive for abdominal pain (RUQ). Negative for anal bleeding, constipation, diarrhea, nausea and vomiting.   Genitourinary: Negative for difficulty urinating, dysuria, frequency, penile discharge and urgency.   Musculoskeletal: Positive for back pain. Negative for arthralgias and  gait problem.   Skin: Negative for rash.   Neurological: Negative for dizziness, syncope, weakness and headaches.   Psychiatric/Behavioral: Negative for dysphoric mood and suicidal ideas. The patient is not nervous/anxious.          Objective:        /69 (BP Location: Right arm, Patient Position: Sitting, BP Method: Large (Automatic))   Pulse 82   Temp 98.4 °F (36.9 °C) (Oral)   Resp 18   Ht 6' (1.829 m)   Wt 129.1 kg (284 lb 8.1 oz)   SpO2 98%   BMI 38.59 kg/m²      Physical Exam  Constitutional:       General: He is not in acute distress.     Appearance: He is well-developed.   HENT:      Head: Normocephalic and atraumatic.      Right Ear: External ear normal.      Left Ear: External ear normal.      Nose: No mucosal edema.      Right Sinus: No maxillary sinus tenderness or frontal sinus tenderness.      Left Sinus: No maxillary sinus tenderness or frontal sinus tenderness.   Eyes:      General: No scleral icterus.     Conjunctiva/sclera: Conjunctivae normal.   Neck:      Thyroid: No thyromegaly.   Cardiovascular:      Rate and Rhythm: Normal rate and regular rhythm.      Heart sounds: Normal heart sounds. No murmur heard.    No friction rub. No gallop.   Pulmonary:      Effort: Pulmonary effort is normal.      Breath sounds: Normal breath sounds. No wheezing or rales.   Abdominal:      General: Bowel sounds are normal. There is no distension.      Palpations: Abdomen is soft. There is no mass.      Tenderness: There is no abdominal tenderness.   Musculoskeletal:         General: No deformity. Normal range of motion.      Cervical back: Normal range of motion and neck supple.        Back:       Right lower leg: No edema.      Left lower leg: No edema.      Comments: FROM of L-spine. + discomfort on flexion and twisting to right. Pain localized to area outline in red. No reproducible tenderness elicited. No ttp vertebral spinous processes.   Skin:     General: Skin is warm and dry.      Findings: No  erythema or rash.   Neurological:      Mental Status: He is alert and oriented to person, place, and time.      Cranial Nerves: No cranial nerve deficit.      Sensory: No sensory deficit.      Deep Tendon Reflexes:      Reflex Scores:       Patellar reflexes are 2+ on the right side and 2+ on the left side.     Comments: 5/5 strength to bilateral lower extremities.    Psychiatric:         Behavior: Behavior normal.         Assessment:       1. Chronic right-sided thoracic back pain    2. RUQ pain    3. Class 2 obesity without serious comorbidity with body mass index (BMI) of 38.0 to 38.9 in adult, unspecified obesity type    4. Vitamin D deficiency    5. Prediabetes    6. Low HDL (under 40)    7. Tobacco dependence    8. Other ulcerative colitis without complication    9. Need for shingles vaccine    10. Annual physical exam        Plan:       Mingo was seen today for annual exam.    Diagnoses and all orders for this visit:    Chronic right-sided thoracic back pain  -     Ambulatory referral/consult to Physical/Occupational Therapy; Future    RUQ pain  -     US Soft Tissue Abdomen; Future    Class 2 obesity without serious comorbidity with body mass index (BMI) of 38.0 to 38.9 in adult, unspecified obesity type    Vitamin D deficiency    Prediabetes    Low HDL (under 40)    Tobacco dependence    Other ulcerative colitis without complication    Need for shingles vaccine  -     varicella-zoster [id number], PF, (SHINGRIX, PF,) 50 mcg/0.5 mL injection; Inject 0.5 mLs into the muscle once. for 1 dose    Annual physical exam  -     CBC Auto Differential; Future  -     Comprehensive Metabolic Panel; Future  -     Lipid Panel; Future  -     Vitamin D; Future  -     Hemoglobin A1C; Future  -     Hepatitis C Antibody; Future    Right-sided Thoracic Back Pain; RUQ pain   --present x 1 year. Noted with position changes only  --At end of visit he did recall lifting a heavy toolbox into the back of his truck about 1 year  ago  --Symptoms most likely myofascial in nature   --Given chronicity, referred to PT. Also given [facility] spine conditioning HEP in event he is not able to attend PT.   --RUQ soft tissue abdominal sono to r/o abdominal wall hernia      Morbidly Obese   Body mass index is 38.59 kg/m².  Advised at least 30 min of CV exercise 5 days a week. Encouraged plant-based diet. Advised small/frequent meals.  Also advised avoidance of sweetened beverages, limit portion sizes, and discussed healthy carbohydrate options (brown rice, 100% whole wheat bread, wheat pasta)      Abnormal Glc  A1c 6.3 6/15/19. Repeat today 03/21/22   Discussed healthy diet/lifestyle modifications as above; weight loss encouraged    Low HDL   , TG 54, HDL 30, LDL 92.2 6/15/19; 10 yr ASCVD risk 11.10%; would be 5.93% if no tobacco use.  Repeat today 03/21/22   Advised tobacco cessation.     Vitamin D deficiency  Vitamin D 19 9/24/19  Repeat Vitamin D today 03/21/22    UC   Currently in remission--untreated   11/20/2020; Dr. Eben Garcia ([facility]). Showed internal hemorrhoids, erythema/congestion in colon, moderate diverticulosis of sigmoid colon. Planning for repeat 10 years [2030]    Tobacco Use   Currently working to cut down on his own   Declines referral for tobacco cessation today  Pneumovax-23 administered 6/6/2019    HM  BP normotensive  Body mass index is 38.59 kg/m².--see above  Encouraged annual eye exams and adherence with rx'd lenses. Encouraged dental exams q6 months  PSA 0.50 06/2019--family history of prostate cancer (2 maternal uncles dx'd at 61 yo). Repeat due 06/2023  Last Flu Vaccine: N/A  Declines COVID-19 booster  Last Tdap Vaccine: 6/3/17  Shingrix rx'd to pharmacy  Non-fasting labs as above    F/U plan pending in 3 months

## 2022-03-22 LAB — HCV AB SERPL QL IA: NEGATIVE

## 2022-03-23 ENCOUNTER — PATIENT MESSAGE (OUTPATIENT)
Dept: PRIMARY CARE CLINIC | Facility: CLINIC | Age: 52
End: 2022-03-23
Payer: COMMERCIAL

## 2022-03-23 DIAGNOSIS — E55.9 VITAMIN D DEFICIENCY: Primary | ICD-10-CM

## 2022-03-23 RX ORDER — ERGOCALCIFEROL 1.25 MG/1
50000 CAPSULE ORAL
Qty: 12 CAPSULE | Refills: 0 | Status: SHIPPED | OUTPATIENT
Start: 2022-03-23 | End: 2022-06-03

## 2022-03-24 ENCOUNTER — PATIENT MESSAGE (OUTPATIENT)
Dept: PRIMARY CARE CLINIC | Facility: CLINIC | Age: 52
End: 2022-03-24
Payer: COMMERCIAL

## 2022-03-25 ENCOUNTER — HOSPITAL ENCOUNTER (OUTPATIENT)
Dept: RADIOLOGY | Facility: HOSPITAL | Age: 52
Discharge: HOME | End: 2022-03-25
Attending: FAMILY MEDICINE
Payer: COMMERCIAL

## 2022-03-25 DIAGNOSIS — R10.11 RUQ PAIN: ICD-10-CM

## 2022-03-25 PROCEDURE — 76705 US SOFT TISSUE, ABDOMEN: ICD-10-PCS | Mod: 26,,, | Performed by: INTERNAL MEDICINE

## 2022-03-25 PROCEDURE — 76705 ECHO EXAM OF ABDOMEN: CPT | Mod: TC

## 2022-03-25 PROCEDURE — 76705 ECHO EXAM OF ABDOMEN: CPT | Mod: 26,,, | Performed by: INTERNAL MEDICINE

## 2022-03-29 ENCOUNTER — PATIENT MESSAGE (OUTPATIENT)
Dept: PRIMARY CARE CLINIC | Facility: CLINIC | Age: 52
End: 2022-03-29
Payer: COMMERCIAL

## 2022-05-17 ENCOUNTER — NURSE TRIAGE (OUTPATIENT)
Dept: ADMINISTRATIVE | Facility: CLINIC | Age: 52
End: 2022-05-17
Payer: COMMERCIAL

## 2022-05-17 NOTE — TELEPHONE ENCOUNTER
+ COVID via home test 5/17.   Current temp 100.4 PO  C/o sinus drainage.   Care advice provided per protocol, with recommendation to continue home care, isolate and notify anyone possibly exposed. Verbalizes understanding.   Reason for Disposition   [1] COVID-19 diagnosed by positive lab test (e.g., PCR, rapid self-test kit) AND [2] mild symptoms (e.g., cough, fever, others) AND [3] no complications or SOB    Additional Information   Negative: SEVERE difficulty breathing (e.g., struggling for each breath, speaks in single words)   Negative: Difficult to awaken or acting confused (e.g., disoriented, slurred speech)   Negative: Bluish (or gray) lips or face now   Negative: Shock suspected (e.g., cold/pale/clammy skin, too weak to stand, low BP, rapid pulse)   Negative: Sounds like a life-threatening emergency to the triager   Negative: SEVERE or constant chest pain or pressure (Exception: mild central chest pain, present only when coughing)   Negative: MODERATE difficulty breathing (e.g., speaks in phrases, SOB even at rest, pulse 100-120)   Negative: [1] Headache AND [2] stiff neck (can't touch chin to chest)   Negative: Chest pain or pressure   Negative: Patient sounds very sick or weak to the triager   Negative: MILD difficulty breathing (e.g., minimal/no SOB at rest, SOB with walking, pulse <100)   Negative: Fever > 103 F (39.4 C)   Negative: [1] Fever > 101 F (38.3 C) AND [2] age > 60 years   Negative: [1] Fever > 100.0 F (37.8 C) AND [2] bedridden (e.g., nursing home patient, CVA, chronic illness, recovering from surgery)   Negative: HIGH RISK for severe COVID complications (e.g., age > 64 years, obesity with BMI > 25, pregnant, chronic lung disease or other chronic medical condition)  (Exception: Already seen by PCP and no new or worsening symptoms.)   Negative: [1] HIGH RISK patient AND [2] influenza is widespread in the community AND [3] ONE OR MORE respiratory symptoms: cough, sore throat,  runny or stuffy nose   Negative: [1] HIGH RISK patient AND [2] influenza exposure within the last 7 days AND [3] ONE OR MORE respiratory symptoms: cough, sore throat, runny or stuffy nose   Negative: Fever present > 3 days (72 hours)   Negative: [1] Fever returns after gone for over 24 hours AND [2] symptoms worse or not improved   Negative: [1] Continuous (nonstop) coughing interferes with work or school AND [2] no improvement using cough treatment per Care Advice   Negative: [1] COVID-19 infection suspected by caller or triager AND [2] mild symptoms (cough, fever, or others) AND [3] has not gotten tested yet   Negative: [1] COVID-19 infection suspected by caller or triager AND [2] mild symptoms (cough, fever, or others) AND [3] negative COVID-19 rapid test   Negative: Cough present > 3 weeks   Negative: [1] COVID-19 diagnosed by positive lab test (e.g., PCR, rapid self-test kit) AND [2] NO symptoms (e.g., cough, fever, others)    Protocols used: CORONAVIRUS (COVID-19) DIAGNOSED OR [id number]

## 2022-06-03 ENCOUNTER — OFFICE VISIT (OUTPATIENT)
Dept: PRIMARY CARE CLINIC | Facility: CLINIC | Age: 52
End: 2022-06-03
Payer: COMMERCIAL

## 2022-06-03 ENCOUNTER — LAB VISIT (OUTPATIENT)
Dept: PRIMARY CARE CLINIC | Facility: CLINIC | Age: 52
End: 2022-06-03
Payer: COMMERCIAL

## 2022-06-03 ENCOUNTER — TELEPHONE (OUTPATIENT)
Dept: PRIMARY CARE CLINIC | Facility: CLINIC | Age: 52
End: 2022-06-03

## 2022-06-03 VITALS
SYSTOLIC BLOOD PRESSURE: 126 MMHG | OXYGEN SATURATION: 97 % | DIASTOLIC BLOOD PRESSURE: 79 MMHG | HEART RATE: 75 BPM | BODY MASS INDEX: 37.83 KG/M2 | WEIGHT: 279.31 LBS | HEIGHT: 72 IN

## 2022-06-03 DIAGNOSIS — R73.09 ABNORMAL GLUCOSE: Primary | ICD-10-CM

## 2022-06-03 DIAGNOSIS — E66.9 CLASS 2 OBESITY WITHOUT SERIOUS COMORBIDITY WITH BODY MASS INDEX (BMI) OF 38.0 TO 38.9 IN ADULT, UNSPECIFIED OBESITY TYPE: ICD-10-CM

## 2022-06-03 DIAGNOSIS — E55.9 VITAMIN D DEFICIENCY: ICD-10-CM

## 2022-06-03 DIAGNOSIS — E78.6 LOW HDL (UNDER 40): ICD-10-CM

## 2022-06-03 DIAGNOSIS — K51.80 OTHER ULCERATIVE COLITIS WITHOUT COMPLICATION: ICD-10-CM

## 2022-06-03 DIAGNOSIS — F17.200 TOBACCO DEPENDENCE: ICD-10-CM

## 2022-06-03 DIAGNOSIS — R73.09 ABNORMAL GLUCOSE: ICD-10-CM

## 2022-06-03 LAB
25(OH)D3+25(OH)D2 SERPL-MCNC: 21 NG/ML (ref 30–96)
ESTIMATED AVG GLUCOSE: 140 MG/DL (ref 68–131)
HBA1C MFR BLD: 6.5 % (ref 4–5.6)

## 2022-06-03 PROCEDURE — 99999 PR PBB SHADOW E&M-EST. PATIENT-LVL III: ICD-10-PCS | Mod: PBBFAC,,, | Performed by: FAMILY MEDICINE

## 2022-06-03 PROCEDURE — 99214 OFFICE O/P EST MOD 30 MIN: CPT | Mod: S$GLB,,, | Performed by: FAMILY MEDICINE

## 2022-06-03 PROCEDURE — 82306 VITAMIN D 25 HYDROXY: CPT | Performed by: FAMILY MEDICINE

## 2022-06-03 PROCEDURE — 83036 HEMOGLOBIN GLYCOSYLATED A1C: CPT | Performed by: FAMILY MEDICINE

## 2022-06-03 PROCEDURE — 99214 PR OFFICE/OUTPT VISIT, EST, LEVL IV, 30-39 MIN: ICD-10-PCS | Mod: S$GLB,,, | Performed by: FAMILY MEDICINE

## 2022-06-03 PROCEDURE — 36415 COLL VENOUS BLD VENIPUNCTURE: CPT | Mod: PBBFAC,PN

## 2022-06-03 PROCEDURE — 99213 OFFICE O/P EST LOW 20 MIN: CPT | Mod: PBBFAC,PN | Performed by: FAMILY MEDICINE

## 2022-06-03 PROCEDURE — 99999 PR PBB SHADOW E&M-EST. PATIENT-LVL III: CPT | Mod: PBBFAC,,, | Performed by: FAMILY MEDICINE

## 2022-06-03 NOTE — TELEPHONE ENCOUNTER
----- Message from Nishi Lowery sent at 6/3/2022 12:50 PM CDT -----  Contact: Brent, [phone]  Calling to get a note for seeing the doctor today. Please advise. Thanks.

## 2022-06-03 NOTE — PROGRESS NOTES
Subjective:       Patient ID: Mingo Kahn is a 51 y.o. male.    Chief Complaint: Follow-up    49 yo M, established pt of mine (last visit 03/21/22), with PMH significant for ulcerative colitis, Vitamin D deficiency, and Tobacco use. He presents today to f/u abnormal A1c and Vitamin D. His A1c was  6.9 3/21/2022; A1c 6.3 6/15/19. Over the past 3 months he has been exercising 4-5x per week for 30-60 minutes. Additionally, he has eliminated bread, pasta, potatoes, and rice from his diet. Net 5lb weight loss since last visit. His Vitamin D level was 10 3/21/22. Completed 12 week course of ergocalciferol 50K U qweek. Now adherent with OTC Vitamin D.       Past Medical History:   Diagnosis Date    Diverticulitis     Ulcerative (chronic) enterocolitis        Patient Active Problem List   Diagnosis    Ulcerative colitis    BMI 36.0-36.9,adult    Vitamin D deficiency    Prediabetes    Low HDL (under 40)    Tobacco dependence    Class 2 obesity without serious comorbidity with body mass index (BMI) of 38.0 to 38.9 in adult       Past Surgical History:   Procedure Laterality Date    COLONOSCOPY W/ BIOPSIES  2015       Family History   Problem Relation Age of Onset    Hyperlipidemia Mother     Hypertension Mother     Cancer Father 71        Pancreatic Cancer    Stroke Maternal Grandmother     Heart attack Maternal Grandmother     Stomach cancer Paternal Grandmother     Prostate cancer Maternal Uncle     Prostate cancer Maternal Uncle     Throat cancer Maternal Uncle     Stomach cancer Paternal Uncle     Breast cancer Paternal Aunt     Breast cancer Maternal Aunt        Social History     Tobacco Use   Smoking Status Current Every Day Smoker    Packs/day: 0.25    Years: 30.00    Pack years: 7.50   Smokeless Tobacco Never Used       Social History     Social History Narrative    EtOH: 1-2 beers and 1-2 mixed drinks on weekends     Drug: None    Employment: Construction; 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 Wife passed 2021    3 children        Medications have been reviewed and reconciled.     Review of patient's allergies indicates:  No Known Allergies     Review of Systems   Constitutional: Negative for activity change, appetite change, chills, fever and unexpected weight change.   HENT: Negative for congestion, sneezing and sore throat.    Eyes: Negative for redness, itching and visual disturbance.   Respiratory: Negative for cough, shortness of breath and wheezing.    Cardiovascular: Negative for chest pain.   Gastrointestinal: Negative for abdominal pain, anal bleeding, constipation, diarrhea, nausea and vomiting.   Genitourinary: Negative for difficulty urinating, dysuria, frequency, penile discharge and urgency.   Musculoskeletal: Negative for arthralgias, back pain and gait problem.   Skin: Negative for rash.   Neurological: Negative for dizziness, syncope, weakness and headaches.   Psychiatric/Behavioral: Negative for dysphoric mood and suicidal ideas. The patient is not nervous/anxious.          Objective:        /79 (BP Location: Right arm, Patient Position: Sitting, BP Method: Large (Automatic))   Pulse 75   Ht 6' (1.829 m)   Wt 126.7 kg (279 lb 5.2 oz)   SpO2 97%   BMI 37.88 kg/m²      Physical Exam  Constitutional:       General: He is not in acute distress.     Appearance: He is well-developed.   HENT:      Head: Normocephalic and atraumatic.      Right Ear: External ear normal.      Left Ear: External ear normal.   Eyes:      General: No scleral icterus.     Conjunctiva/sclera: Conjunctivae normal.   Cardiovascular:      Rate and Rhythm: Normal rate and regular rhythm.      Heart sounds: Normal heart sounds. No murmur heard.    No friction rub. No gallop.   Pulmonary:      Effort: Pulmonary effort is normal.      Breath sounds: Normal breath sounds. No wheezing or rales.   Musculoskeletal:         General: No deformity. Normal range of motion.       Cervical back: Normal range of motion and neck supple.      Right lower leg: No edema.      Left lower leg: No edema.   Skin:     General: Skin is warm and dry.      Findings: No erythema or rash.   Neurological:      Mental Status: He is alert and oriented to person, place, and time.      Cranial Nerves: No cranial nerve deficit.      Comments:     Psychiatric:         Behavior: Behavior normal.           Assessment:       1. Abnormal glucose    2. Vitamin D deficiency    3. Class 2 obesity without serious comorbidity with body mass index (BMI) of 38.0 to 38.9 in adult, unspecified obesity type    4. Low HDL (under 40)    5. Other ulcerative colitis without complication    6. Tobacco dependence        Plan:       Mingo was seen today for follow-up.    Diagnoses and all orders for this visit:    Abnormal glucose  -     Hemoglobin A1C; Future    Vitamin D deficiency  -     Vitamin D; Future    Class 2 obesity without serious comorbidity with body mass index (BMI) of 38.0 to 38.9 in adult, unspecified obesity type    Low HDL (under 40)    Other ulcerative colitis without complication    Tobacco dependence    Abnormal Glc  A1c 6.9 3/21/2022  A1c 6.3 6/15/19.   Has been working on diet/lifestyle modifications x 3 months  Repeat A1c today 6/3/22    Vitamin D deficiency  Vitamin D 10 3/21/22  Completed ergocalciferol 50K U qweek x 12 weeks. Now taking OTC Vitamin D  Repeat Vitamin D today    Morbidly Obese   Body mass index is 37.88 kg/m².  Advised at least 30 min of CV exercise 5 days a week. Encouraged plant-based diet. Advised small/frequent meals.  Also advised avoidance of sweetened beverages, limit portion sizes, and discussed healthy carbohydrate options (brown rice, 100% whole wheat bread, wheat pasta)      Low HDL   , , HDL 37, LDL 71.6 3/21/221  The 10-year ASCVD risk score (Childwoldalta SANZ Jr., et al., 2013) is: 9%    Values used to calculate the score:      Age: 51 years      Sex: Male      Is  Non- : Yes      Diabetic: No      Tobacco smoker: Yes      Systolic Blood Pressure: 126 mmHg      Is BP treated: No      HDL Cholesterol: 37 mg/dL      Total Cholesterol: 135 mg/dL  Advised tobacco cessation.     UC   Currently in remission--untreated   11/20/2020; Dr. Eben Garcia ([facility]). Showed internal hemorrhoids, erythema/congestion in colon, moderate diverticulosis of sigmoid colon. Planning for repeat 10 years [2030]    Tobacco Use   Currently working to cut down on his own   Declines referral for tobacco cessation  Pneumovax-23 administered 6/6/2019      BP normotensive  Body mass index is 38.59 kg/m².--see above  Encouraged annual eye exams and adherence with rx'd lenses. Encouraged dental exams q6 months  PSA 0.50 06/2019--family history of prostate cancer (2 maternal uncles dx'd at 59 yo). Repeat due 06/2023  Last Flu Vaccine: N/A  Declines COVID-19 booster  Last Tdap Vaccine: 6/3/17  Shingrix rx'd to pharmacy during previous visit. Pt plans to have administered      F/U plan pending lab results     ===========  Right-sided Thoracic Back Pain; RUQ pain   --present x 1 year. Noted with position changes only  --At end of visit he did recall lifting a heavy toolbox into the back of his truck about 1 year ago  --Symptoms most likely myofascial in nature   --Given chronicity, referred to PT. Also given [facility] spine conditioning HEP in event he is not able to attend PT.   --RUQ soft tissue abdominal sono to r/o abdominal wall hernia

## 2022-06-12 ENCOUNTER — PATIENT MESSAGE (OUTPATIENT)
Dept: PRIMARY CARE CLINIC | Facility: CLINIC | Age: 52
End: 2022-06-12
Payer: COMMERCIAL

## 2022-06-13 ENCOUNTER — PATIENT MESSAGE (OUTPATIENT)
Dept: SMOKING CESSATION | Facility: CLINIC | Age: 52
End: 2022-06-13
Payer: COMMERCIAL

## 2022-07-13 DIAGNOSIS — E11.9 TYPE 2 DIABETES MELLITUS WITHOUT COMPLICATION, UNSPECIFIED WHETHER LONG TERM INSULIN USE: ICD-10-CM

## 2022-07-13 DIAGNOSIS — E11.9 TYPE 2 DIABETES MELLITUS WITHOUT COMPLICATION: ICD-10-CM

## 2022-08-12 ENCOUNTER — LAB VISIT (OUTPATIENT)
Dept: PRIMARY CARE CLINIC | Facility: CLINIC | Age: 52
End: 2022-08-12
Payer: COMMERCIAL

## 2022-08-12 ENCOUNTER — CLINICAL SUPPORT (OUTPATIENT)
Dept: PRIMARY CARE CLINIC | Facility: CLINIC | Age: 52
End: 2022-08-12
Payer: COMMERCIAL

## 2022-08-12 ENCOUNTER — OFFICE VISIT (OUTPATIENT)
Dept: PRIMARY CARE CLINIC | Facility: CLINIC | Age: 52
End: 2022-08-12
Payer: COMMERCIAL

## 2022-08-12 VITALS
HEIGHT: 72 IN | OXYGEN SATURATION: 97 % | SYSTOLIC BLOOD PRESSURE: 110 MMHG | HEART RATE: 71 BPM | BODY MASS INDEX: 36.8 KG/M2 | DIASTOLIC BLOOD PRESSURE: 76 MMHG | WEIGHT: 271.69 LBS

## 2022-08-12 DIAGNOSIS — E11.9 TYPE 2 DIABETES MELLITUS WITHOUT COMPLICATION, WITHOUT LONG-TERM CURRENT USE OF INSULIN: Primary | ICD-10-CM

## 2022-08-12 DIAGNOSIS — E78.6 LOW HDL (UNDER 40): ICD-10-CM

## 2022-08-12 DIAGNOSIS — E11.9 TYPE 2 DIABETES MELLITUS WITHOUT COMPLICATION, WITHOUT LONG-TERM CURRENT USE OF INSULIN: ICD-10-CM

## 2022-08-12 DIAGNOSIS — E55.9 VITAMIN D DEFICIENCY: ICD-10-CM

## 2022-08-12 DIAGNOSIS — B35.3 TINEA PEDIS OF BOTH FEET: ICD-10-CM

## 2022-08-12 DIAGNOSIS — F17.200 TOBACCO DEPENDENCE: ICD-10-CM

## 2022-08-12 DIAGNOSIS — K51.80 OTHER ULCERATIVE COLITIS WITHOUT COMPLICATION: ICD-10-CM

## 2022-08-12 DIAGNOSIS — E66.01 CLASS 2 SEVERE OBESITY WITH SERIOUS COMORBIDITY AND BODY MASS INDEX (BMI) OF 36.0 TO 36.9 IN ADULT, UNSPECIFIED OBESITY TYPE: ICD-10-CM

## 2022-08-12 LAB
ALBUMIN/CREAT UR: 2.1 UG/MG (ref 0–30)
CREAT UR-MCNC: 290 MG/DL (ref 23–375)
ESTIMATED AVG GLUCOSE: 148 MG/DL (ref 68–131)
HBA1C MFR BLD: 6.8 % (ref 4–5.6)
MICROALBUMIN UR DL<=1MG/L-MCNC: 6 UG/ML

## 2022-08-12 PROCEDURE — 82570 ASSAY OF URINE CREATININE: CPT | Performed by: FAMILY MEDICINE

## 2022-08-12 PROCEDURE — 99999 PR PBB SHADOW E&M-EST. PATIENT-LVL III: ICD-10-PCS | Mod: PBBFAC,,, | Performed by: FAMILY MEDICINE

## 2022-08-12 PROCEDURE — 99999 PR PBB SHADOW E&M-EST. PATIENT-LVL III: CPT | Mod: PBBFAC,,, | Performed by: FAMILY MEDICINE

## 2022-08-12 PROCEDURE — 99214 OFFICE O/P EST MOD 30 MIN: CPT | Mod: S$GLB,,, | Performed by: FAMILY MEDICINE

## 2022-08-12 PROCEDURE — 3074F PR MOST RECENT SYSTOLIC BLOOD PRESSURE < 130 MM HG: ICD-10-PCS | Mod: CPTII,S$GLB,, | Performed by: FAMILY MEDICINE

## 2022-08-12 PROCEDURE — 36415 PR COLLECTION VENOUS BLOOD,VENIPUNCTURE: ICD-10-PCS | Mod: S$GLB,,, | Performed by: FAMILY MEDICINE

## 2022-08-12 PROCEDURE — 3061F NEG MICROALBUMINURIA REV: CPT | Mod: CPTII,S$GLB,, | Performed by: FAMILY MEDICINE

## 2022-08-12 PROCEDURE — 82043 UR ALBUMIN QUANTITATIVE: CPT | Performed by: FAMILY MEDICINE

## 2022-08-12 PROCEDURE — 1159F MED LIST DOCD IN RCRD: CPT | Mod: CPTII,S$GLB,, | Performed by: FAMILY MEDICINE

## 2022-08-12 PROCEDURE — 3078F DIAST BP <80 MM HG: CPT | Mod: CPTII,S$GLB,, | Performed by: FAMILY MEDICINE

## 2022-08-12 PROCEDURE — 3066F NEPHROPATHY DOC TX: CPT | Mod: CPTII,S$GLB,, | Performed by: FAMILY MEDICINE

## 2022-08-12 PROCEDURE — 3074F SYST BP LT 130 MM HG: CPT | Mod: CPTII,S$GLB,, | Performed by: FAMILY MEDICINE

## 2022-08-12 PROCEDURE — 36415 COLL VENOUS BLD VENIPUNCTURE: CPT | Mod: S$GLB,,, | Performed by: FAMILY MEDICINE

## 2022-08-12 PROCEDURE — 3008F BODY MASS INDEX DOCD: CPT | Mod: CPTII,S$GLB,, | Performed by: FAMILY MEDICINE

## 2022-08-12 PROCEDURE — 3061F PR NEG MICROALBUMINURIA RESULT DOCUMENTED/REVIEW: ICD-10-PCS | Mod: CPTII,S$GLB,, | Performed by: FAMILY MEDICINE

## 2022-08-12 PROCEDURE — 3008F PR BODY MASS INDEX (BMI) DOCUMENTED: ICD-10-PCS | Mod: CPTII,S$GLB,, | Performed by: FAMILY MEDICINE

## 2022-08-12 PROCEDURE — 3044F PR MOST RECENT HEMOGLOBIN A1C LEVEL <7.0%: ICD-10-PCS | Mod: CPTII,S$GLB,, | Performed by: FAMILY MEDICINE

## 2022-08-12 PROCEDURE — 3078F PR MOST RECENT DIASTOLIC BLOOD PRESSURE < 80 MM HG: ICD-10-PCS | Mod: CPTII,S$GLB,, | Performed by: FAMILY MEDICINE

## 2022-08-12 PROCEDURE — 99214 PR OFFICE/OUTPT VISIT, EST, LEVL IV, 30-39 MIN: ICD-10-PCS | Mod: S$GLB,,, | Performed by: FAMILY MEDICINE

## 2022-08-12 PROCEDURE — 1159F PR MEDICATION LIST DOCUMENTED IN MEDICAL RECORD: ICD-10-PCS | Mod: CPTII,S$GLB,, | Performed by: FAMILY MEDICINE

## 2022-08-12 PROCEDURE — 3066F PR DOCUMENTATION OF TREATMENT FOR NEPHROPATHY: ICD-10-PCS | Mod: CPTII,S$GLB,, | Performed by: FAMILY MEDICINE

## 2022-08-12 PROCEDURE — 3044F HG A1C LEVEL LT 7.0%: CPT | Mod: CPTII,S$GLB,, | Performed by: FAMILY MEDICINE

## 2022-08-12 PROCEDURE — 83036 HEMOGLOBIN GLYCOSYLATED A1C: CPT | Performed by: FAMILY MEDICINE

## 2022-08-12 RX ORDER — CHOLECALCIFEROL (VITAMIN D3) 25 MCG
1000 TABLET ORAL DAILY
COMMUNITY

## 2022-08-12 RX ORDER — KETOCONAZOLE 20 MG/G
CREAM TOPICAL DAILY
Qty: 30 G | Refills: 0 | Status: SHIPPED | OUTPATIENT
Start: 2022-08-12 | End: 2022-12-05 | Stop reason: SDUPTHER

## 2022-08-12 NOTE — PATIENT INSTRUCTIONS
PLEASE SCHEDULE EYE APPT    Fasting Sugar levels (first thing in the morning) should be  mg/dL    Sugar 2 hrs after eating should be less than 180 mg/dL

## 2022-08-12 NOTE — PROGRESS NOTES
Subjective:       Patient ID: Mingo Kahn is a 52 y.o. male.    Chief Complaint: Follow-up    53 yo M, established pt of mine (last visit 03/21/22), with PMH significant for ulcerative colitis, Vitamin D deficiency, and Tobacco use. He presents today to discuss newly dx'd DM-2. He is 51 min late to appt. His A1c was 6.5 06/03/2022; 6.9 3/21/2022; A1c 6.3 6/15/19.  He continues diet/lifestyle modifications: Exercising 4-5x per week for 30-60 minutes; eliminated bread, pasta, potatoes, and rice from his diet. Consumes meats, vegetables, or salads with turkey/chicken. Has fried fish for a treat on occasion. Net 13 lb weight loss over past 5 months. His Vitamin D level was 21 06/03/2022. Adherent with OTC Vitamin D3 1000 IU daily.         Past Medical History:   Diagnosis Date    Diverticulitis     Ulcerative (chronic) enterocolitis        Patient Active Problem List   Diagnosis    Ulcerative colitis    BMI 36.0-36.9,adult    Vitamin D deficiency    Prediabetes    Low HDL (under 40)    Tobacco dependence    Class 2 obesity without serious comorbidity with body mass index (BMI) of 38.0 to 38.9 in adult    Tinea pedis of both feet    Type 2 diabetes mellitus without complication, without long-term current use of insulin       Past Surgical History:   Procedure Laterality Date    COLONOSCOPY W/ BIOPSIES  2015       Family History   Problem Relation Age of Onset    Hyperlipidemia Mother     Hypertension Mother     Cancer Father 71        Pancreatic Cancer    Stroke Maternal Grandmother     Heart attack Maternal Grandmother     Stomach cancer Paternal Grandmother     Prostate cancer Maternal Uncle     Prostate cancer Maternal Uncle     Throat cancer Maternal Uncle     Stomach cancer Paternal Uncle     Breast cancer Paternal Aunt     Breast cancer Maternal Aunt        Social History     Tobacco Use   Smoking Status Current Every Day Smoker    Packs/day: 0.25    Years: 30.00    Pack years: 7.50    Smokeless Tobacco Never Used       Social History     Social History Narrative    EtOH: 1-2 beers and 1-2 mixed drinks on weekends     Drug: None    Employment: Construction; 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 Wife passed 2021    3 children        Medications have been reviewed and reconciled.     Review of patient's allergies indicates:  No Known Allergies     Review of Systems   Constitutional: Negative for activity change, appetite change, chills, fever and unexpected weight change.   HENT: Negative for congestion, sneezing and sore throat.    Eyes: Negative for redness, itching and visual disturbance.   Respiratory: Negative for cough, shortness of breath and wheezing.    Cardiovascular: Negative for chest pain.   Gastrointestinal: Negative for abdominal pain, anal bleeding, constipation, diarrhea, nausea and vomiting.   Endocrine: Negative for polydipsia, polyphagia and polyuria.   Genitourinary: Negative for difficulty urinating, dysuria, frequency, penile discharge and urgency.   Musculoskeletal: Negative for arthralgias, back pain and gait problem.   Skin: Negative for rash.   Neurological: Negative for dizziness, syncope, weakness and headaches.   Psychiatric/Behavioral: Negative for dysphoric mood and suicidal ideas. The patient is not nervous/anxious.          Objective:        /76 (BP Location: Left arm, Patient Position: Sitting, BP Method: Large (Manual))   Pulse 71   Ht 6' (1.829 m)   Wt 123.2 kg (271 lb 11.5 oz)   SpO2 97%   BMI 36.85 kg/m²      Physical Exam  Constitutional:       General: He is not in acute distress.     Appearance: He is well-developed.   HENT:      Head: Normocephalic and atraumatic.      Right Ear: External ear normal.      Left Ear: External ear normal.   Eyes:      General: No scleral icterus.     Conjunctiva/sclera: Conjunctivae normal.   Cardiovascular:      Rate and Rhythm: Normal rate and regular rhythm.      Pulses:            Dorsalis pedis pulses are 2+ on the right side and 2+ on the left side.        Posterior tibial pulses are 2+ on the right side and 2+ on the left side.      Heart sounds: Normal heart sounds. No murmur heard.    No friction rub. No gallop.   Pulmonary:      Effort: Pulmonary effort is normal.      Breath sounds: Normal breath sounds. No wheezing or rales.   Musculoskeletal:         General: No deformity. Normal range of motion.      Cervical back: Normal range of motion and neck supple.      Right lower leg: No edema.      Left lower leg: No edema.   Feet:      Right foot:      Protective Sensation: 4 sites tested. 4 sites sensed.      Skin integrity: Dry skin present.      Toenail Condition: Right toenails are abnormally thick. Fungal disease present.     Left foot:      Protective Sensation: 4 sites tested. 4 sites sensed.      Skin integrity: Dry skin present.      Toenail Condition: Left toenails are abnormally thick. Fungal disease present.     Comments: + scaling to 2nd and 4th interdigital spaces on the left and 3rd interdigital space on right.    Skin:     General: Skin is warm and dry.      Findings: No erythema or rash.   Neurological:      Mental Status: He is alert and oriented to person, place, and time.      Cranial Nerves: No cranial nerve deficit.      Comments:     Psychiatric:         Behavior: Behavior normal.       Protective Sensation (w/ 10 gram monofilament):  Right: Intact  Left: Intact    Visual Inspection:  Dry Skin -  Bilateral and Onychomycosis -  Bilateral    Pedal Pulses:   Right: Present  Left: Present    Posterior tibialis:   Right:Present  Left: Present    Assessment:       1. Type 2 diabetes mellitus without complication, without long-term current use of insulin    2. Tinea pedis of both feet    3. Vitamin D deficiency    4. Class 2 severe obesity with serious comorbidity and body mass index (BMI) of 36.0 to 36.9 in adult, unspecified obesity type    5. Low HDL (under 40)    6.  Other ulcerative colitis without complication    7. Tobacco dependence        Plan:       Mingo was seen today for follow-up.    Diagnoses and all orders for this visit:    Type 2 diabetes mellitus without complication, without long-term current use of insulin  -     Foot Exam Performed  -     Microalbumin/Creatinine Ratio, Urine; Future  -     Hemoglobin A1C; Future    Tinea pedis of both feet  -     ketoconazole (NIZORAL) 2 % cream; Apply topically once daily.    Vitamin D deficiency    Class 2 severe obesity with serious comorbidity and body mass index (BMI) of 36.0 to 36.9 in adult, unspecified obesity type    Low HDL (under 40)    Other ulcerative colitis without complication    Tobacco dependence      Type 2 DM   New diagnosis. Diet-controlled.  A1c 6.5 06/03/2022; A1c 6.9 3/21/2022; A1c 6.3 6/15/19.   Repeat A1c and obtain micro/cr today 6/3/22  Eye Appt: [facility] 06/2022. Referred to optho. Will request records  Foot Exam done today 08/12/2022. Monofilament exam normal. Tinea Pedis, onychomycosis, and onychogryphosis present.   10 yr ASCVD risk 13.7% 3/2022  Discuss initiation of crestor 5 mg hs on f/u   Discuss initiation of ARB on f/u  Pneumovax-23 administered 6/6/2019  Continue diet/lifestyle modifications    Tinea Pedis; bilateral   Tx with Ketoconazole 2% daily x 2-4 weeks    Vitamin D deficiency  Vitamin D 21 06/03/2022  Continue OTC Vitamin D    Morbidly Obese   Body mass index is 36.85 kg/m². 13lb weight loss over past 5 months   Advised at least 30 min of CV exercise 5 days a week. Encouraged plant-based diet. Advised small/frequent meals.  Also advised avoidance of sweetened beverages, limit portion sizes, and discussed healthy carbohydrate options (brown rice, 100% whole wheat bread, wheat pasta)      Low HDL   , , HDL 37, LDL 71.6 3/21/22  The 10-year ASCVD risk score (Leora SANZ Jr., et al., 2013) is: 13.7%    Values used to calculate the score:      Age: 52 years       Sex: Male      Is Non- : Yes      Diabetic: Yes      Tobacco smoker: Yes      Systolic Blood Pressure: 110 mmHg      Is BP treated: No      HDL Cholesterol: 37 mg/dL      Total Cholesterol: 135 mg/dL  Advised tobacco cessation.   Consider statin initiation on f/u    UC   Currently in remission--untreated   11/20/2020; [facility] Showed internal hemorrhoids, erythema/congestion in colon, moderate diverticulosis of sigmoid colon. Planning for repeat 10 years [2030]    Tobacco Use   Currently working to cut down on his own   Declines referral for tobacco cessation  Pneumovax-23 administered 6/6/2019      BP normotensive  Body mass index is 38.59 kg/m².--see above  Encouraged annual eye exams and adherence with rx'd lenses. Encouraged dental exams q6 months  PSA 0.50 06/2019--family history of prostate cancer (2 maternal uncles dx'd at 59 yo). Repeat due 06/2023  Last Flu Vaccine: N/A  Declines COVID-19 booster  Last Tdap Vaccine: 6/3/17  Shingrix rx'd to pharmacy during previous visit. Pt plans to have administered      F/U plan pending lab results     ===========  Right-sided Thoracic Back Pain; RUQ pain   --present x 1 year. Noted with position changes only  --At end of visit he did recall lifting a heavy toolbox into the back of his truck about 1 year ago  --Symptoms most likely myofascial in nature   --Given chronicity, referred to PT. Also given [facility] spine conditioning HEP in event he is not able to attend PT.   --RUQ soft tissue abdominal sono to r/o abdominal wall hernia

## 2022-08-14 ENCOUNTER — PATIENT MESSAGE (OUTPATIENT)
Dept: PRIMARY CARE CLINIC | Facility: CLINIC | Age: 52
End: 2022-08-14
Payer: COMMERCIAL

## 2022-08-14 DIAGNOSIS — E11.9 TYPE 2 DIABETES MELLITUS WITHOUT COMPLICATION, WITHOUT LONG-TERM CURRENT USE OF INSULIN: Primary | ICD-10-CM

## 2022-08-14 RX ORDER — ROSUVASTATIN CALCIUM 5 MG/1
5 TABLET, COATED ORAL NIGHTLY
Qty: 90 TABLET | Refills: 3 | Status: SHIPPED | OUTPATIENT
Start: 2022-08-14 | End: 2023-08-14

## 2022-11-15 ENCOUNTER — LAB VISIT (OUTPATIENT)
Dept: LAB | Facility: HOSPITAL | Age: 52
End: 2022-11-15
Attending: FAMILY MEDICINE
Payer: COMMERCIAL

## 2022-11-15 DIAGNOSIS — E11.9 TYPE 2 DIABETES MELLITUS WITHOUT COMPLICATION, WITHOUT LONG-TERM CURRENT USE OF INSULIN: ICD-10-CM

## 2022-11-15 LAB
ESTIMATED AVG GLUCOSE: 140 MG/DL (ref 68–131)
HBA1C MFR BLD: 6.5 % (ref 4–5.6)

## 2022-11-15 PROCEDURE — 83036 HEMOGLOBIN GLYCOSYLATED A1C: CPT | Performed by: FAMILY MEDICINE

## 2022-11-15 PROCEDURE — 36415 COLL VENOUS BLD VENIPUNCTURE: CPT | Performed by: FAMILY MEDICINE

## 2022-11-20 ENCOUNTER — PATIENT MESSAGE (OUTPATIENT)
Dept: PRIMARY CARE CLINIC | Facility: CLINIC | Age: 52
End: 2022-11-20
Payer: COMMERCIAL

## 2022-12-05 ENCOUNTER — OFFICE VISIT (OUTPATIENT)
Dept: PRIMARY CARE CLINIC | Facility: CLINIC | Age: 52
End: 2022-12-05
Payer: COMMERCIAL

## 2022-12-05 VITALS
DIASTOLIC BLOOD PRESSURE: 64 MMHG | SYSTOLIC BLOOD PRESSURE: 105 MMHG | HEART RATE: 78 BPM | TEMPERATURE: 98 F | HEIGHT: 72 IN | WEIGHT: 285.94 LBS | BODY MASS INDEX: 38.73 KG/M2 | OXYGEN SATURATION: 98 %

## 2022-12-05 DIAGNOSIS — M25.512 ACUTE PAIN OF BOTH SHOULDERS: ICD-10-CM

## 2022-12-05 DIAGNOSIS — Z76.0 ENCOUNTER FOR MEDICATION REFILL: ICD-10-CM

## 2022-12-05 DIAGNOSIS — M25.511 ACUTE PAIN OF BOTH SHOULDERS: ICD-10-CM

## 2022-12-05 DIAGNOSIS — B35.3 TINEA PEDIS OF BOTH FEET: ICD-10-CM

## 2022-12-05 DIAGNOSIS — G47.30 SLEEP APNEA, UNSPECIFIED TYPE: Primary | ICD-10-CM

## 2022-12-05 DIAGNOSIS — Z87.828: ICD-10-CM

## 2022-12-05 PROCEDURE — 99213 PR OFFICE/OUTPT VISIT, EST, LEVL III, 20-29 MIN: ICD-10-PCS | Mod: S$GLB,,, | Performed by: NURSE PRACTITIONER

## 2022-12-05 PROCEDURE — 1160F PR REVIEW ALL MEDS BY PRESCRIBER/CLIN PHARMACIST DOCUMENTED: ICD-10-PCS | Mod: CPTII,S$GLB,, | Performed by: NURSE PRACTITIONER

## 2022-12-05 PROCEDURE — 3074F PR MOST RECENT SYSTOLIC BLOOD PRESSURE < 130 MM HG: ICD-10-PCS | Mod: CPTII,S$GLB,, | Performed by: NURSE PRACTITIONER

## 2022-12-05 PROCEDURE — 3078F PR MOST RECENT DIASTOLIC BLOOD PRESSURE < 80 MM HG: ICD-10-PCS | Mod: CPTII,S$GLB,, | Performed by: NURSE PRACTITIONER

## 2022-12-05 PROCEDURE — 1160F RVW MEDS BY RX/DR IN RCRD: CPT | Mod: CPTII,S$GLB,, | Performed by: NURSE PRACTITIONER

## 2022-12-05 PROCEDURE — 3044F PR MOST RECENT HEMOGLOBIN A1C LEVEL <7.0%: ICD-10-PCS | Mod: CPTII,S$GLB,, | Performed by: NURSE PRACTITIONER

## 2022-12-05 PROCEDURE — 99213 OFFICE O/P EST LOW 20 MIN: CPT | Mod: S$GLB,,, | Performed by: NURSE PRACTITIONER

## 2022-12-05 PROCEDURE — 3066F PR DOCUMENTATION OF TREATMENT FOR NEPHROPATHY: ICD-10-PCS | Mod: CPTII,S$GLB,, | Performed by: NURSE PRACTITIONER

## 2022-12-05 PROCEDURE — 99999 PR PBB SHADOW E&M-EST. PATIENT-LVL V: ICD-10-PCS | Mod: PBBFAC,,, | Performed by: NURSE PRACTITIONER

## 2022-12-05 PROCEDURE — 1159F PR MEDICATION LIST DOCUMENTED IN MEDICAL RECORD: ICD-10-PCS | Mod: CPTII,S$GLB,, | Performed by: NURSE PRACTITIONER

## 2022-12-05 PROCEDURE — 3078F DIAST BP <80 MM HG: CPT | Mod: CPTII,S$GLB,, | Performed by: NURSE PRACTITIONER

## 2022-12-05 PROCEDURE — 3008F PR BODY MASS INDEX (BMI) DOCUMENTED: ICD-10-PCS | Mod: CPTII,S$GLB,, | Performed by: NURSE PRACTITIONER

## 2022-12-05 PROCEDURE — 3074F SYST BP LT 130 MM HG: CPT | Mod: CPTII,S$GLB,, | Performed by: NURSE PRACTITIONER

## 2022-12-05 PROCEDURE — 1159F MED LIST DOCD IN RCRD: CPT | Mod: CPTII,S$GLB,, | Performed by: NURSE PRACTITIONER

## 2022-12-05 PROCEDURE — 3008F BODY MASS INDEX DOCD: CPT | Mod: CPTII,S$GLB,, | Performed by: NURSE PRACTITIONER

## 2022-12-05 PROCEDURE — 99999 PR PBB SHADOW E&M-EST. PATIENT-LVL V: CPT | Mod: PBBFAC,,, | Performed by: NURSE PRACTITIONER

## 2022-12-05 PROCEDURE — 3066F NEPHROPATHY DOC TX: CPT | Mod: CPTII,S$GLB,, | Performed by: NURSE PRACTITIONER

## 2022-12-05 PROCEDURE — 3044F HG A1C LEVEL LT 7.0%: CPT | Mod: CPTII,S$GLB,, | Performed by: NURSE PRACTITIONER

## 2022-12-05 PROCEDURE — 3061F PR NEG MICROALBUMINURIA RESULT DOCUMENTED/REVIEW: ICD-10-PCS | Mod: CPTII,S$GLB,, | Performed by: NURSE PRACTITIONER

## 2022-12-05 PROCEDURE — 3061F NEG MICROALBUMINURIA REV: CPT | Mod: CPTII,S$GLB,, | Performed by: NURSE PRACTITIONER

## 2022-12-05 RX ORDER — KETOCONAZOLE 20 MG/G
CREAM TOPICAL DAILY
Qty: 30 G | Refills: 0 | Status: SHIPPED | OUTPATIENT
Start: 2022-12-05

## 2022-12-05 NOTE — LETTER
December 5, 2022      [facility]  [address]  [facility] [address]  Phone: [phone]  Fax: [phone]       Patient: Mingo Kahn   YOB: 1970  Date of Visit: 12/05/2022    To Whom It May Concern:    Abril Kahn  was at [facility] on 12/05/2022. The patient may return to work/school on 12/05 with no restrictions. If you have any questions or concerns, or if I can be of further assistance, please do not hesitate to contact me.    Sincerely,    Tiffani Jo NP

## 2022-12-05 NOTE — PROGRESS NOTES
"Subjective:       Patient ID: Mingo Kahn is a 52 y.o. male.    Chief Complaint: Sleep Apnea    Mr. Migno Kahn is a 52 year old female, new to me, presents to the clinic with sleep apnea and bilateral shoulder pain. PCP is Nicko Elias. Medical and surgical history in addition to problem list reviewed as listed below.    Experienced sleep apnea for about a month, significant other noticed periods of apnea lasting approximately 5 seconds during the night. He states he is a "mouth breather."     Shoulder Pain   The pain is present in the right shoulder and left shoulder. This is a new problem. The current episode started more than 1 month ago. There has been a history of trauma (football/basketball). The problem occurs every several days. The problem has been gradually worsening. The quality of the pain is described as pounding (throbbing). The pain is at a severity of 5/10. The pain is moderate. Associated symptoms include a limited range of motion and stiffness. Pertinent negatives include no fever, headaches, inability to bear weight, itching, joint locking, joint swelling, numbness or tingling. The symptoms are aggravated by activity, cold and lying down. He has tried movement for the symptoms. The treatment provided no relief. Family history includes arthritis. His past medical history is significant for Injuries to Extremity. There is no history of diabetes.     Past Medical History:   Diagnosis Date    Diverticulitis     Ulcerative (chronic) enterocolitis         Past Surgical History:   Procedure Laterality Date    COLONOSCOPY W/ BIOPSIES  2015        Family History   Problem Relation Age of Onset    Hyperlipidemia Mother     Hypertension Mother     Cancer Father 71        Pancreatic Cancer    Stroke Maternal Grandmother     Heart attack Maternal Grandmother     Stomach cancer Paternal Grandmother     Prostate cancer Maternal Uncle     Prostate cancer Maternal Uncle     Throat cancer Maternal " Uncle     Stomach cancer Paternal Uncle     Breast cancer Paternal Aunt     Breast cancer Maternal Aunt        Social History     Tobacco Use   Smoking Status Every Day    Packs/day: 0.25    Years: 30.00    Pack years: 7.50    Types: Cigarettes   Smokeless Tobacco Never       Social History     Social History Narrative    EtOH: 1-2 beers and 1-2 mixed drinks on weekends     Drug: None    Employment: Construction; 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 Wife passed 2021    3 children        Review of patient's allergies indicates:  No Known Allergies     Review of Systems   Constitutional:  Negative for fever.   Musculoskeletal:  Positive for stiffness.   Skin:  Negative for itching.   Neurological:  Negative for tingling, numbness and headaches.       Objective:        Vitals:    12/05/22 1030   BP: 105/64   Pulse: 78   Temp: 97.6 °F (36.4 °C)        Physical Exam  Constitutional:       Appearance: Normal appearance.   Pulmonary:      Effort: Pulmonary effort is normal.   Neurological:      Mental Status: He is alert and oriented to person, place, and time.       Assessment:       1. Sleep apnea, unspecified type    2. Acute pain of both shoulders    3. H/O nose injury    4. Encounter for medication refill    5. Tinea pedis of both feet        Plan:       Sleep apnea, unspecified type  Sleep study.  Sleep hygiene.  Document sleep schedule.  Ambulatory referral/consult to Sleep Disorders; Future; Expected date: 12/05/2022    Acute pain of both shoulders  Rule out bursitis, tendonitis, fracture, arthritis and dislocation.  -     X-Ray Shoulder Complete Bilateral; Future; Expected date: 12/05/2022    H/O nose injury  Comments:  Seen in the past by ENT, as it relates to cartilage in right nare  Orders:  -     Ambulatory referral/consult to ENT; Future; Expected date: 12/05/2022    Encounter for medication refill    Tinea pedis of both feet  -     ketoconazole (NIZORAL) 2 % cream; Apply  topically once daily.  Dispense: 30 g; Refill: 0     Medication List with Changes/Refills   Current Medications    ROSUVASTATIN (CRESTOR) 5 MG TABLET    Take 1 tablet (5 mg total) by mouth every evening.    VITAMIN D (VITAMIN D3) 1000 UNITS TAB    Take 1,000 Units by mouth once daily.   Changed and/or Refilled Medications    Modified Medication Previous Medication    KETOCONAZOLE (NIZORAL) 2 % CREAM ketoconazole (NIZORAL) 2 % cream       Apply topically once daily.    Apply topically once daily.            Follow up in about 25 days (around 12/30/2022), or if symptoms worsen or fail to improve.    Tiffani Jo APRN, MSN, FNP-C

## 2022-12-06 ENCOUNTER — HOSPITAL ENCOUNTER (OUTPATIENT)
Dept: RADIOLOGY | Facility: HOSPITAL | Age: 52
Discharge: HOME | End: 2022-12-06
Attending: NURSE PRACTITIONER
Payer: COMMERCIAL

## 2022-12-06 DIAGNOSIS — M25.512 ACUTE PAIN OF BOTH SHOULDERS: ICD-10-CM

## 2022-12-06 DIAGNOSIS — M25.511 ACUTE PAIN OF BOTH SHOULDERS: ICD-10-CM

## 2022-12-06 PROCEDURE — 73030 X-RAY EXAM OF SHOULDER: CPT | Mod: TC,50,FY

## 2022-12-06 PROCEDURE — 73030 X-RAY EXAM OF SHOULDER: CPT | Mod: 26,,, | Performed by: RADIOLOGY

## 2022-12-06 PROCEDURE — 73030 XR SHOULDER COMPLETE 2 OR MORE VIEWS BILATERAL: ICD-10-PCS | Mod: 26,,, | Performed by: RADIOLOGY

## 2022-12-07 DIAGNOSIS — M19.011 OSTEOARTHRITIS OF BILATERAL GLENOHUMERAL JOINTS: Primary | ICD-10-CM

## 2022-12-07 DIAGNOSIS — M19.012 OSTEOARTHRITIS OF BILATERAL GLENOHUMERAL JOINTS: Primary | ICD-10-CM

## 2022-12-07 DIAGNOSIS — M25.711: ICD-10-CM

## 2022-12-07 DIAGNOSIS — M25.712: ICD-10-CM

## 2022-12-18 ENCOUNTER — OFFICE VISIT (OUTPATIENT)
Dept: URGENT CARE | Facility: CLINIC | Age: 52
End: 2022-12-18
Payer: COMMERCIAL

## 2022-12-18 VITALS
BODY MASS INDEX: 38.65 KG/M2 | WEIGHT: 285 LBS | SYSTOLIC BLOOD PRESSURE: 131 MMHG | OXYGEN SATURATION: 95 % | TEMPERATURE: 98 F | RESPIRATION RATE: 20 BRPM | DIASTOLIC BLOOD PRESSURE: 79 MMHG | HEART RATE: 91 BPM

## 2022-12-18 DIAGNOSIS — Z20.2 STD EXPOSURE: ICD-10-CM

## 2022-12-18 DIAGNOSIS — M54.9 MUSCULOSKELETAL BACK PAIN: ICD-10-CM

## 2022-12-18 DIAGNOSIS — R30.0 DYSURIA: Primary | ICD-10-CM

## 2022-12-18 LAB
BILIRUB UR QL STRIP: NEGATIVE
GLUCOSE UR QL STRIP: NEGATIVE
KETONES UR QL STRIP: NEGATIVE
LEUKOCYTE ESTERASE UR QL STRIP: NEGATIVE
PH, POC UA: 5 (ref 5–8)
POC BLOOD, URINE: NEGATIVE
POC NITRATES, URINE: NEGATIVE
PROT UR QL STRIP: NEGATIVE
SP GR UR STRIP: 1.02 (ref 1–1.03)
UROBILINOGEN UR STRIP-ACNC: NORMAL (ref 0.3–2.2)

## 2022-12-18 PROCEDURE — 3078F PR MOST RECENT DIASTOLIC BLOOD PRESSURE < 80 MM HG: ICD-10-PCS | Mod: CPTII,S$GLB,, | Performed by: FAMILY MEDICINE

## 2022-12-18 PROCEDURE — 99203 OFFICE O/P NEW LOW 30 MIN: CPT | Mod: S$GLB,,, | Performed by: FAMILY MEDICINE

## 2022-12-18 PROCEDURE — 3008F PR BODY MASS INDEX (BMI) DOCUMENTED: ICD-10-PCS | Mod: CPTII,S$GLB,, | Performed by: FAMILY MEDICINE

## 2022-12-18 PROCEDURE — 3066F NEPHROPATHY DOC TX: CPT | Mod: CPTII,S$GLB,, | Performed by: FAMILY MEDICINE

## 2022-12-18 PROCEDURE — 81003 POCT URINALYSIS, DIPSTICK, AUTOMATED, W/O SCOPE: ICD-10-PCS | Mod: QW,S$GLB,, | Performed by: FAMILY MEDICINE

## 2022-12-18 PROCEDURE — 3044F PR MOST RECENT HEMOGLOBIN A1C LEVEL <7.0%: ICD-10-PCS | Mod: CPTII,S$GLB,, | Performed by: FAMILY MEDICINE

## 2022-12-18 PROCEDURE — 3066F PR DOCUMENTATION OF TREATMENT FOR NEPHROPATHY: ICD-10-PCS | Mod: CPTII,S$GLB,, | Performed by: FAMILY MEDICINE

## 2022-12-18 PROCEDURE — 87491 CHLMYD TRACH DNA AMP PROBE: CPT | Performed by: FAMILY MEDICINE

## 2022-12-18 PROCEDURE — 3075F SYST BP GE 130 - 139MM HG: CPT | Mod: CPTII,S$GLB,, | Performed by: FAMILY MEDICINE

## 2022-12-18 PROCEDURE — 3075F PR MOST RECENT SYSTOLIC BLOOD PRESS GE 130-139MM HG: ICD-10-PCS | Mod: CPTII,S$GLB,, | Performed by: FAMILY MEDICINE

## 2022-12-18 PROCEDURE — 3061F PR NEG MICROALBUMINURIA RESULT DOCUMENTED/REVIEW: ICD-10-PCS | Mod: CPTII,S$GLB,, | Performed by: FAMILY MEDICINE

## 2022-12-18 PROCEDURE — 1159F MED LIST DOCD IN RCRD: CPT | Mod: CPTII,S$GLB,, | Performed by: FAMILY MEDICINE

## 2022-12-18 PROCEDURE — 81003 URINALYSIS AUTO W/O SCOPE: CPT | Mod: QW,S$GLB,, | Performed by: FAMILY MEDICINE

## 2022-12-18 PROCEDURE — 99203 PR OFFICE/OUTPT VISIT, NEW, LEVL III, 30-44 MIN: ICD-10-PCS | Mod: S$GLB,,, | Performed by: FAMILY MEDICINE

## 2022-12-18 PROCEDURE — 87661 TRICHOMONAS VAGINALIS AMPLIF: CPT | Performed by: FAMILY MEDICINE

## 2022-12-18 PROCEDURE — 3044F HG A1C LEVEL LT 7.0%: CPT | Mod: CPTII,S$GLB,, | Performed by: FAMILY MEDICINE

## 2022-12-18 PROCEDURE — 3078F DIAST BP <80 MM HG: CPT | Mod: CPTII,S$GLB,, | Performed by: FAMILY MEDICINE

## 2022-12-18 PROCEDURE — 3008F BODY MASS INDEX DOCD: CPT | Mod: CPTII,S$GLB,, | Performed by: FAMILY MEDICINE

## 2022-12-18 PROCEDURE — 87591 N.GONORRHOEAE DNA AMP PROB: CPT | Performed by: FAMILY MEDICINE

## 2022-12-18 PROCEDURE — 3061F NEG MICROALBUMINURIA REV: CPT | Mod: CPTII,S$GLB,, | Performed by: FAMILY MEDICINE

## 2022-12-18 PROCEDURE — 1159F PR MEDICATION LIST DOCUMENTED IN MEDICAL RECORD: ICD-10-PCS | Mod: CPTII,S$GLB,, | Performed by: FAMILY MEDICINE

## 2022-12-18 RX ORDER — METRONIDAZOLE 500 MG/1
TABLET ORAL
Qty: 4 TABLET | Refills: 0 | Status: SHIPPED | OUTPATIENT
Start: 2022-12-18 | End: 2023-02-27

## 2022-12-18 RX ORDER — DOXYCYCLINE 100 MG/1
100 CAPSULE ORAL 2 TIMES DAILY
Qty: 20 CAPSULE | Refills: 0 | Status: SHIPPED | OUTPATIENT
Start: 2022-12-18 | End: 2022-12-28

## 2022-12-18 NOTE — PROGRESS NOTES
Subjective:       Patient ID: Mingo Kahn is a 52 y.o. male.    Vitals:  weight is 129.3 kg (285 lb). His temperature is 98 °F (36.7 °C). His blood pressure is 131/79 and his pulse is 91. His respiration is 20 and oxygen saturation is 95%.     Chief Complaint: Back Pain    Pt is complaining of lower back pain that started about a week ago. He said he is wanting to get STD tested due to his partner having yeast infection. Pt states has been having some clear discahrge x 3 days after having unprotected exposure,  Wife has been tx with metro and diflucan  Also c/o LBP, for few days, no dysuria      Back Pain  This is a new problem. The current episode started 1 to 4 weeks ago. The pain is at a severity of 7/10. Associated symptoms include bladder incontinence and dysuria. Pertinent negatives include no bowel incontinence, chest pain, fever, headaches, leg pain, numbness, paresis, paresthesias, pelvic pain, perianal numbness, tingling, weakness or weight loss. He has tried nothing for the symptoms. The treatment provided no relief.     Constitution: Negative for fever.   Cardiovascular:  Negative for chest pain.   Gastrointestinal:  Negative for bowel incontinence.   Genitourinary:  Positive for dysuria and bladder incontinence. Negative for pelvic pain.   Musculoskeletal:  Positive for back pain.   Neurological:  Negative for headaches and numbness.     Objective:      Physical Exam   Constitutional: He is oriented to person, place, and time. He appears well-developed. He is cooperative.  Non-toxic appearance. He does not appear ill. No distress.   HENT:   Head: Normocephalic and atraumatic.   Ears:   Right Ear: Hearing, tympanic membrane, external ear and ear canal normal.   Left Ear: Hearing, tympanic membrane, external ear and ear canal normal.   Nose: Nose normal. No mucosal edema, rhinorrhea or nasal deformity. No epistaxis. Right sinus exhibits no maxillary sinus tenderness and no frontal sinus tenderness.  Left sinus exhibits no maxillary sinus tenderness and no frontal sinus tenderness.   Mouth/Throat: Uvula is midline, oropharynx is clear and moist and mucous membranes are normal. Mucous membranes are moist. No trismus in the jaw. Normal dentition. No uvula swelling. No oropharyngeal exudate. Oropharynx is clear.   Eyes: Conjunctivae and lids are normal. Pupils are equal, round, and reactive to light. Right eye exhibits no discharge. Left eye exhibits no discharge. No scleral icterus. Extraocular movement intact   Neck: Trachea normal and phonation normal. Neck supple.   Cardiovascular: Normal rate, regular rhythm, normal heart sounds and normal pulses.   Pulmonary/Chest: Effort normal and breath sounds normal. No respiratory distress.   Abdominal: Normal appearance and bowel sounds are normal. He exhibits no distension and no mass. Soft. There is no abdominal tenderness.   Musculoskeletal: Normal range of motion.         General: No deformity. Normal range of motion.      Comments: Flexion with increase tenderness  SLRE negative     Neurological: He is alert and oriented to person, place, and time. He exhibits normal muscle tone. Coordination normal.   Skin: Skin is warm, dry, intact, not diaphoretic and not pale.   Psychiatric: His speech is normal and behavior is normal. Judgment and thought content normal.   Nursing note and vitals reviewed.      Assessment:       1. Dysuria    2. STD exposure    3. Musculoskeletal back pain          Plan:         Dysuria  -     POCT Urinalysis, Dipstick, Automated, W/O Scope  -     C. trachomatis/N. gonorrhoeae by AMP DNA    STD exposure  -     C. trachomatis/N. gonorrhoeae by AMP DNA    Musculoskeletal back pain          Results for orders placed or performed in visit on 12/18/22   POCT Urinalysis, Dipstick, Automated, W/O Scope   Result Value Ref Range    POC Blood, Urine Negative Negative    POC Bilirubin, Urine Negative Negative    POC Urobilinogen, Urine Norm 0.3 - 2.2     POC Ketones, Urine Negative Negative    POC Protein, Urine Negative Negative    POC Nitrates, Urine Negative Negative    POC Glucose, Urine Negative Negative    pH, UA 5.0 5 - 8    POC Specific Gravity, Urine 1.025 1.003 - 1.029    POC Leukocytes, Urine Negative Negative              Also advised to take Aleve 2 po bid x aweek    Advised tx of partner prior to exposure again

## 2022-12-19 ENCOUNTER — TELEPHONE (OUTPATIENT)
Dept: URGENT CARE | Facility: CLINIC | Age: 52
End: 2022-12-19
Payer: COMMERCIAL

## 2022-12-19 LAB
C TRACH DNA SPEC QL NAA+PROBE: NOT DETECTED
N GONORRHOEA DNA SPEC QL NAA+PROBE: NOT DETECTED

## 2022-12-20 ENCOUNTER — OFFICE VISIT (OUTPATIENT)
Dept: OTOLARYNGOLOGY | Facility: CLINIC | Age: 52
End: 2022-12-20
Payer: COMMERCIAL

## 2022-12-20 VITALS — TEMPERATURE: 98 F | HEART RATE: 78 BPM | DIASTOLIC BLOOD PRESSURE: 80 MMHG | SYSTOLIC BLOOD PRESSURE: 120 MMHG

## 2022-12-20 DIAGNOSIS — G47.9 SLEEP DISTURBANCE: ICD-10-CM

## 2022-12-20 DIAGNOSIS — Z87.828: ICD-10-CM

## 2022-12-20 DIAGNOSIS — J34.2 NASAL SEPTAL DEVIATION: Primary | ICD-10-CM

## 2022-12-20 DIAGNOSIS — J34.89 NASAL OBSTRUCTION: ICD-10-CM

## 2022-12-20 DIAGNOSIS — J34.3 NASAL TURBINATE HYPERTROPHY: ICD-10-CM

## 2022-12-20 PROCEDURE — 99204 PR OFFICE/OUTPT VISIT, NEW, LEVL IV, 45-59 MIN: ICD-10-PCS | Mod: S$GLB,,, | Performed by: OTOLARYNGOLOGY

## 2022-12-20 PROCEDURE — 1159F MED LIST DOCD IN RCRD: CPT | Mod: CPTII,S$GLB,, | Performed by: OTOLARYNGOLOGY

## 2022-12-20 PROCEDURE — 3066F NEPHROPATHY DOC TX: CPT | Mod: CPTII,S$GLB,, | Performed by: OTOLARYNGOLOGY

## 2022-12-20 PROCEDURE — 3074F PR MOST RECENT SYSTOLIC BLOOD PRESSURE < 130 MM HG: ICD-10-PCS | Mod: CPTII,S$GLB,, | Performed by: OTOLARYNGOLOGY

## 2022-12-20 PROCEDURE — 1160F RVW MEDS BY RX/DR IN RCRD: CPT | Mod: CPTII,S$GLB,, | Performed by: OTOLARYNGOLOGY

## 2022-12-20 PROCEDURE — 3044F HG A1C LEVEL LT 7.0%: CPT | Mod: CPTII,S$GLB,, | Performed by: OTOLARYNGOLOGY

## 2022-12-20 PROCEDURE — 1159F PR MEDICATION LIST DOCUMENTED IN MEDICAL RECORD: ICD-10-PCS | Mod: CPTII,S$GLB,, | Performed by: OTOLARYNGOLOGY

## 2022-12-20 PROCEDURE — 3079F DIAST BP 80-89 MM HG: CPT | Mod: CPTII,S$GLB,, | Performed by: OTOLARYNGOLOGY

## 2022-12-20 PROCEDURE — 3066F PR DOCUMENTATION OF TREATMENT FOR NEPHROPATHY: ICD-10-PCS | Mod: CPTII,S$GLB,, | Performed by: OTOLARYNGOLOGY

## 2022-12-20 PROCEDURE — 99204 OFFICE O/P NEW MOD 45 MIN: CPT | Mod: S$GLB,,, | Performed by: OTOLARYNGOLOGY

## 2022-12-20 PROCEDURE — 3074F SYST BP LT 130 MM HG: CPT | Mod: CPTII,S$GLB,, | Performed by: OTOLARYNGOLOGY

## 2022-12-20 PROCEDURE — 3061F NEG MICROALBUMINURIA REV: CPT | Mod: CPTII,S$GLB,, | Performed by: OTOLARYNGOLOGY

## 2022-12-20 PROCEDURE — 1160F PR REVIEW ALL MEDS BY PRESCRIBER/CLIN PHARMACIST DOCUMENTED: ICD-10-PCS | Mod: CPTII,S$GLB,, | Performed by: OTOLARYNGOLOGY

## 2022-12-20 PROCEDURE — 3061F PR NEG MICROALBUMINURIA RESULT DOCUMENTED/REVIEW: ICD-10-PCS | Mod: CPTII,S$GLB,, | Performed by: OTOLARYNGOLOGY

## 2022-12-20 PROCEDURE — 3079F PR MOST RECENT DIASTOLIC BLOOD PRESSURE 80-89 MM HG: ICD-10-PCS | Mod: CPTII,S$GLB,, | Performed by: OTOLARYNGOLOGY

## 2022-12-20 PROCEDURE — 3044F PR MOST RECENT HEMOGLOBIN A1C LEVEL <7.0%: ICD-10-PCS | Mod: CPTII,S$GLB,, | Performed by: OTOLARYNGOLOGY

## 2022-12-20 NOTE — PROGRESS NOTES
Mr. Kahn     Vitals:    22 1541   BP: 120/80   Pulse: 78   Temp: 97.7 °F (36.5 °C)       Chief Complaint:  Nasal obstruction     HPI:  Mr. Kahn is a 52-year-old black male who presents with complaints of nasal obstruction.  He was referred by FLORY Jo.  He has history of nasal trauma in the past.  He was seen by previous ENT several years ago and diagnosed with septal deviation.  He has since developed significant snoring and witnessed apneic episodes.    SNOT22- 7 NOSE- 70    Review of Systems:  Constitutional:   weight loss or weight gain: Negative  Allergy/Immunologic:   Negative  Nasal Congestion/Obstruction:   Positive for obstruction.  Nosebleeds:   Negative  Sinus infections:   Negative  Headache/Facial Pain:   Negative  Snoring/KEVIN:   Positive for snoring and sleep disturbance.  Throat: Infections/Pain:   Negative  Hoarseness/Speech Disturbance:   Negative  Trauma Hx:  Negative    Cardiovascular:  M/I Angina: Negative  Hypertension: Negative  Endocrine:    DM/Steroids:  History of prediabetes  GI:   Dysphagia/Reflux: Negative, positive history of ulcerative colitis.  :   GYN Pregnancy: Negative  Renal:   Dialysis: Negative  Lymphatic:   Neck Mass/Lymphadenopathy: Negative  Muscoloskeletal:   Negative  Hematologic:   Bleeding Disorders/Anemia: Negative  Neurologic:    Cranial/Neuralgia: Negative  Pulmonary:   Asthma/SOB/Cough: Negative  Skin Disorders: Negative    Past Medical/Surgical/Family/Social History:    ENT Surgery: Negative  Occupational Exposure: Negative   Problems: Negative  Cancer: Negative    Past Family History:   Family history of Cancer: Negative    Past Social History:   Tobacco:  Daily smoker   Alcohol: Social Drinker      Allergies and medications: Reviewed per med card.    Physical Examination:  Ears:   External auditory canals:  Clear   Hearing: Grossly intact   Tympanic Membranes: Clear  Nose:   External: Normal   Intranasal:  Septal deviation and spur to the  left with 2+ turbinates creating 85% obstruction.  Mouth:   Intraorally: Lips, teeth, and gums: Normal   Oropharynx: Normal   Mucosa: Normal   Tongue: Normal  Throat:      Palate: Normal palate with elevation, Mallampati 1   Tonsils:  Minimal bilaterally.   Posterior Pharynx: Normal  Fiberoptic exam: Not performed  Head/Face:     Inspection: Normal and atraumatic   Palpation/Percussion: Non tender   Facial strength: Normal and symmetric   Salivary glands: Normal  Neck: Supple  Thyroid: No masses  Lymphatics: No nodes  Respiratory:   Effort: Normal  Eyes:   Ocular Mobility: Normal   Vision: Grossly intact  Neuro/Psych:   Cranial Nerves: Grossly Intact   Orientation: Normal   Mood/Affect: Normal      Assessment/Plan:  I have discussed my findings with him in detail as well as my recommendations for treatment.  He has a referral to Sleep Medicine from his PCP and he will follow-up with that.  Surgically we discussed that he could benefit from septoplasty and submucous resection of turbinates.  We have discussed the fact that only CPAP and tracheostomy are guarantee treatments of sleep apnea should he be diagnosed with this.  He will follow-up with us after his sleep study is obtained.

## 2022-12-20 NOTE — TELEPHONE ENCOUNTER
Called patient to discuss negative gonorrhea & chlamydia.  Trichomonas pending.  No answer, left VM.

## 2022-12-20 NOTE — TELEPHONE ENCOUNTER
Patient returned call.  Discussed neg gonorrhea and chlamydia.  Trichomonas pending.  He verbalized understanding.

## 2022-12-21 LAB
T VAGINALIS RRNA SPEC QL NAA+PROBE: NOT DETECTED
TRICHOMONAS VAGINALIS RNA, QUAL, SOURCE: NORMAL

## 2023-02-27 ENCOUNTER — LAB VISIT (OUTPATIENT)
Dept: PRIMARY CARE CLINIC | Facility: CLINIC | Age: 53
End: 2023-02-27
Payer: COMMERCIAL

## 2023-02-27 ENCOUNTER — OFFICE VISIT (OUTPATIENT)
Dept: PRIMARY CARE CLINIC | Facility: CLINIC | Age: 53
End: 2023-02-27
Payer: COMMERCIAL

## 2023-02-27 VITALS
HEART RATE: 84 BPM | WEIGHT: 288.56 LBS | BODY MASS INDEX: 39.08 KG/M2 | OXYGEN SATURATION: 99 % | SYSTOLIC BLOOD PRESSURE: 118 MMHG | DIASTOLIC BLOOD PRESSURE: 79 MMHG | TEMPERATURE: 98 F | HEIGHT: 72 IN

## 2023-02-27 DIAGNOSIS — F17.200 TOBACCO DEPENDENCE: ICD-10-CM

## 2023-02-27 DIAGNOSIS — E11.9 TYPE 2 DIABETES MELLITUS WITHOUT COMPLICATION, WITHOUT LONG-TERM CURRENT USE OF INSULIN: ICD-10-CM

## 2023-02-27 DIAGNOSIS — R35.0 URINARY FREQUENCY: ICD-10-CM

## 2023-02-27 DIAGNOSIS — E66.01 CLASS 2 SEVERE OBESITY DUE TO EXCESS CALORIES WITH SERIOUS COMORBIDITY AND BODY MASS INDEX (BMI) OF 39.0 TO 39.9 IN ADULT: ICD-10-CM

## 2023-02-27 DIAGNOSIS — R10.9 FLANK PAIN: Primary | ICD-10-CM

## 2023-02-27 DIAGNOSIS — E78.6 LOW HDL (UNDER 40): ICD-10-CM

## 2023-02-27 DIAGNOSIS — Z12.5 SCREENING FOR PROSTATE CANCER: ICD-10-CM

## 2023-02-27 DIAGNOSIS — R39.15 URINARY URGENCY: ICD-10-CM

## 2023-02-27 PROCEDURE — 84153 ASSAY OF PSA TOTAL: CPT | Performed by: NURSE PRACTITIONER

## 2023-02-27 PROCEDURE — 99999 PR PBB SHADOW E&M-EST. PATIENT-LVL IV: ICD-10-PCS | Mod: PBBFAC,,, | Performed by: NURSE PRACTITIONER

## 2023-02-27 PROCEDURE — 99214 OFFICE O/P EST MOD 30 MIN: CPT | Mod: S$GLB,,, | Performed by: NURSE PRACTITIONER

## 2023-02-27 PROCEDURE — 99214 PR OFFICE/OUTPT VISIT, EST, LEVL IV, 30-39 MIN: ICD-10-PCS | Mod: S$GLB,,, | Performed by: NURSE PRACTITIONER

## 2023-02-27 PROCEDURE — 1159F PR MEDICATION LIST DOCUMENTED IN MEDICAL RECORD: ICD-10-PCS | Mod: CPTII,S$GLB,, | Performed by: NURSE PRACTITIONER

## 2023-02-27 PROCEDURE — 3074F SYST BP LT 130 MM HG: CPT | Mod: CPTII,S$GLB,, | Performed by: NURSE PRACTITIONER

## 2023-02-27 PROCEDURE — 99999 PR PBB SHADOW E&M-EST. PATIENT-LVL IV: CPT | Mod: PBBFAC,,, | Performed by: NURSE PRACTITIONER

## 2023-02-27 PROCEDURE — 1159F MED LIST DOCD IN RCRD: CPT | Mod: CPTII,S$GLB,, | Performed by: NURSE PRACTITIONER

## 2023-02-27 PROCEDURE — 3008F BODY MASS INDEX DOCD: CPT | Mod: CPTII,S$GLB,, | Performed by: NURSE PRACTITIONER

## 2023-02-27 PROCEDURE — 3008F PR BODY MASS INDEX (BMI) DOCUMENTED: ICD-10-PCS | Mod: CPTII,S$GLB,, | Performed by: NURSE PRACTITIONER

## 2023-02-27 PROCEDURE — 3078F DIAST BP <80 MM HG: CPT | Mod: CPTII,S$GLB,, | Performed by: NURSE PRACTITIONER

## 2023-02-27 PROCEDURE — 3074F PR MOST RECENT SYSTOLIC BLOOD PRESSURE < 130 MM HG: ICD-10-PCS | Mod: CPTII,S$GLB,, | Performed by: NURSE PRACTITIONER

## 2023-02-27 PROCEDURE — 3078F PR MOST RECENT DIASTOLIC BLOOD PRESSURE < 80 MM HG: ICD-10-PCS | Mod: CPTII,S$GLB,, | Performed by: NURSE PRACTITIONER

## 2023-02-27 PROCEDURE — 83036 HEMOGLOBIN GLYCOSYLATED A1C: CPT | Performed by: NURSE PRACTITIONER

## 2023-02-27 PROCEDURE — 80061 LIPID PANEL: CPT | Performed by: NURSE PRACTITIONER

## 2023-02-27 PROCEDURE — 80053 COMPREHEN METABOLIC PANEL: CPT | Performed by: NURSE PRACTITIONER

## 2023-02-27 NOTE — PROGRESS NOTES
Subjective:       Patient ID: Mingo Kahn is a 52 y.o. male.    Chief Complaint: Annual Exam    Mr. Mingo Kahn is a 52 year old female, established with me, presents to the clinic with flank pain, urinary frequency and urgency.   Former patient of Nicko Elias. Medical and surgical history in addition to problem list reviewed as listed below.    Flank Pain  This is a new problem. The current episode started 1 to 4 weeks ago. The problem occurs constantly. The problem has been gradually worsening since onset. The pain is present in the lumbar spine. The quality of the pain is described as burning. The pain does not radiate. The pain is at a severity of 7/10. The pain is severe. The pain is The same all the time. The symptoms are aggravated by urinating. Stiffness is present In the morning. Associated symptoms include dysuria. Pertinent negatives include no leg pain or numbness. (Urinary urgency and frequency, cloudy urine  ) Risk factors include lack of exercise and obesity. He has tried nothing for the symptoms.     Patient states he smokes 1.5/pack per week.  Refuses smoking cessation, exercises 3 times a week.      The 10-year ASCVD risk score (Bernadette MOTTA, et al., 2019) is: 15.4%    Values used to calculate the score:      Age: 52 years      Sex: Male      Is Non- : Yes      Diabetic: Yes      Tobacco smoker: Yes      Systolic Blood Pressure: 118 mmHg      Is BP treated: No      HDL Cholesterol: 37 mg/dL      Total Cholesterol: 135 mg/dL     Past Medical History:   Diagnosis Date    Diverticulitis     Ulcerative (chronic) enterocolitis         Past Surgical History:   Procedure Laterality Date    COLONOSCOPY W/ BIOPSIES  2015        Family History   Problem Relation Age of Onset    Hyperlipidemia Mother     Hypertension Mother     Cancer Father 71        Pancreatic Cancer    Stroke Maternal Grandmother     Heart attack Maternal Grandmother     Stomach cancer Paternal  Grandmother     Prostate cancer Maternal Uncle     Prostate cancer Maternal Uncle     Throat cancer Maternal Uncle     Stomach cancer Paternal Uncle     Breast cancer Paternal Aunt     Breast cancer Maternal Aunt        Social History     Tobacco Use   Smoking Status Every Day    Packs/day: 0.25    Years: 30.00    Pack years: 7.50    Types: Cigarettes   Smokeless Tobacco Never       Social History     Social History Narrative    EtOH: 1-2 beers and 1-2 mixed drinks on weekends     Drug: None    Employment: Construction; 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 Wife passed 2021    3 children        Review of patient's allergies indicates:   Allergen Reactions    Bactrim [sulfamethoxazole-trimethoprim]         Review of Systems   Genitourinary:  Positive for dysuria and flank pain.   Neurological:  Negative for numbness.       Objective:        Vitals:    02/27/23 0821   BP: 118/79   Pulse: 84   Temp: 98.4 °F (36.9 °C)        Physical Exam    Assessment:       1. Flank pain    2. Urinary frequency    3. Urinary urgency    4. Class 2 severe obesity due to excess calories with serious comorbidity and body mass index (BMI) of 39.0 to 39.9 in adult    5. Tobacco dependence    6. Type 2 diabetes mellitus without complication, without long-term current use of insulin    7. Low HDL (under 40)    8. Screening for prostate cancer        Plan:       Flank pain  Rule out urinary tract infection, renal stones.  -     CULTURE, URINE  -     Urinalysis    Urinary frequency  -     CULTURE, URINE  -     Urinalysis    Urinary urgency  -     CULTURE, URINE  -     Urinalysis    Class 2 severe obesity due to excess calories with serious comorbidity and body mass index (BMI) of 39.0 to 39.9 in adult  -     CBC Auto Differential; Future; Expected date: 02/27/2023  -     Comprehensive Metabolic Panel; Future; Expected date: 02/27/2023    Tobacco dependence  Smokes 1.5 packs per week, refuses smoking cessation,  encouraged to decrease smoking to half within the next 60-90 days.  Consider participation in smoking cessation program.      Type 2 diabetes mellitus without complication, without long-term current use of insulin  Pending labs, start metformin, along with diet and exercise.    11/15/2022 Hemoglobin A1C- 6.5.  -     Hemoglobin A1C; Future; Expected date: 02/27/2023  -     Microalbumin/Creatinine Ratio, Urine    Low HDL (under 40)  -     Lipid Panel; Future; Expected date: 02/27/2023    Screening for prostate cancer  -     PSA, Screening; Future; Expected date: 02/27/2023    Plans to establish care with MD at [facility].    Labs pending.    Chart review.    Health maintenance reviewed/updated.         Medication List with Changes/Refills   Current Medications    KETOCONAZOLE (NIZORAL) 2 % CREAM    Apply topically once daily.    ROSUVASTATIN (CRESTOR) 5 MG TABLET    Take 1 tablet (5 mg total) by mouth every evening.    VITAMIN D (VITAMIN D3) 1000 UNITS TAB    Take 1,000 Units by mouth once daily.   Discontinued Medications    METRONIDAZOLE (FLAGYL) 500 MG TABLET    Take 4 tablets all at once            Follow up if symptoms worsen or fail to improve.    Tiffani Jo, APRN, MSN, FNP-C

## 2023-02-28 ENCOUNTER — TELEPHONE (OUTPATIENT)
Dept: PRIMARY CARE CLINIC | Facility: CLINIC | Age: 53
End: 2023-02-28
Payer: COMMERCIAL

## 2023-02-28 LAB
ALBUMIN SERPL BCP-MCNC: 3.9 G/DL (ref 3.5–5.2)
ALP SERPL-CCNC: 122 U/L (ref 55–135)
ALT SERPL W/O P-5'-P-CCNC: 32 U/L (ref 10–44)
ANION GAP SERPL CALC-SCNC: 9 MMOL/L (ref 8–16)
AST SERPL-CCNC: 27 U/L (ref 10–40)
BILIRUB SERPL-MCNC: 0.3 MG/DL (ref 0.1–1)
BUN SERPL-MCNC: 16 MG/DL (ref 6–20)
CALCIUM SERPL-MCNC: 9.2 MG/DL (ref 8.7–10.5)
CHLORIDE SERPL-SCNC: 108 MMOL/L (ref 95–110)
CHOLEST SERPL-MCNC: 152 MG/DL (ref 120–199)
CHOLEST/HDLC SERPL: 4.1 {RATIO} (ref 2–5)
CO2 SERPL-SCNC: 25 MMOL/L (ref 23–29)
COMPLEXED PSA SERPL-MCNC: 0.49 NG/ML (ref 0–4)
CREAT SERPL-MCNC: 1 MG/DL (ref 0.5–1.4)
EST. GFR  (NO RACE VARIABLE): >60 ML/MIN/1.73 M^2
ESTIMATED AVG GLUCOSE: 154 MG/DL (ref 68–131)
GLUCOSE SERPL-MCNC: 132 MG/DL (ref 70–110)
HBA1C MFR BLD: 7 % (ref 4–5.6)
HDLC SERPL-MCNC: 37 MG/DL (ref 40–75)
HDLC SERPL: 24.3 % (ref 20–50)
LDLC SERPL CALC-MCNC: 102.2 MG/DL (ref 63–159)
NONHDLC SERPL-MCNC: 115 MG/DL
POTASSIUM SERPL-SCNC: 4.8 MMOL/L (ref 3.5–5.1)
PROT SERPL-MCNC: 7.1 G/DL (ref 6–8.4)
SODIUM SERPL-SCNC: 142 MMOL/L (ref 136–145)
TRIGL SERPL-MCNC: 64 MG/DL (ref 30–150)

## 2023-02-28 NOTE — TELEPHONE ENCOUNTER
----- Message from Berny Davis sent at 2/28/2023  6:04 AM CST -----  Regarding: Client Service  Contact: [phone]  Good Morning,     My name is Berny Davis, I work in the Lab Client Services. We had a problem with some lab work on this patient. If someone from your office could call us at [phone] or [phone] that would be great. Anyone in my department can help.      Thank you,

## 2023-02-28 NOTE — TELEPHONE ENCOUNTER
Called lab client services spoke to Divaune coordinator who advised patient's CBC ordered on yesterday need to be recollected specimen clotted.

## 2023-03-06 ENCOUNTER — PATIENT MESSAGE (OUTPATIENT)
Dept: PRIMARY CARE CLINIC | Facility: CLINIC | Age: 53
End: 2023-03-06
Payer: COMMERCIAL

## 2023-03-07 DIAGNOSIS — R35.0 URINARY FREQUENCY: Primary | ICD-10-CM

## 2023-03-07 DIAGNOSIS — R39.15 URINARY URGENCY: ICD-10-CM

## 2023-03-08 ENCOUNTER — LAB VISIT (OUTPATIENT)
Dept: LAB | Facility: HOSPITAL | Age: 53
End: 2023-03-08
Attending: NURSE PRACTITIONER
Payer: COMMERCIAL

## 2023-03-08 DIAGNOSIS — R39.15 URINARY URGENCY: ICD-10-CM

## 2023-03-08 DIAGNOSIS — R35.0 URINARY FREQUENCY: ICD-10-CM

## 2023-03-08 PROCEDURE — 81003 URINALYSIS AUTO W/O SCOPE: CPT | Performed by: NURSE PRACTITIONER

## 2023-04-25 ENCOUNTER — TELEPHONE (OUTPATIENT)
Dept: SLEEP MEDICINE | Facility: CLINIC | Age: 53
End: 2023-04-25
Payer: COMMERCIAL

## 2023-04-26 ENCOUNTER — TELEPHONE (OUTPATIENT)
Dept: SLEEP MEDICINE | Facility: CLINIC | Age: 53
End: 2023-04-26
Payer: COMMERCIAL

## 2023-04-26 NOTE — TELEPHONE ENCOUNTER
Staff left a voice message informing Patient that staff would like to schedule an appointment follow up

## 2023-05-06 ENCOUNTER — OFFICE VISIT (OUTPATIENT)
Dept: URGENT CARE | Facility: CLINIC | Age: 53
End: 2023-05-06
Payer: COMMERCIAL

## 2023-05-06 VITALS
TEMPERATURE: 98 F | RESPIRATION RATE: 18 BRPM | HEIGHT: 72 IN | DIASTOLIC BLOOD PRESSURE: 79 MMHG | HEART RATE: 81 BPM | BODY MASS INDEX: 39.01 KG/M2 | OXYGEN SATURATION: 96 % | SYSTOLIC BLOOD PRESSURE: 123 MMHG | WEIGHT: 288 LBS

## 2023-05-06 DIAGNOSIS — J30.1 SEASONAL ALLERGIC RHINITIS DUE TO POLLEN: ICD-10-CM

## 2023-05-06 DIAGNOSIS — Z11.52 ENCOUNTER FOR SCREENING LABORATORY TESTING FOR COVID-19 VIRUS: Primary | ICD-10-CM

## 2023-05-06 DIAGNOSIS — J06.9 URI, ACUTE: ICD-10-CM

## 2023-05-06 LAB
CTP QC/QA: YES
SARS-COV-2 AG RESP QL IA.RAPID: NEGATIVE

## 2023-05-06 PROCEDURE — 87811 SARS CORONAVIRUS 2 ANTIGEN POCT, MANUAL READ: ICD-10-PCS | Mod: QW,S$GLB,, | Performed by: FAMILY MEDICINE

## 2023-05-06 PROCEDURE — 99213 PR OFFICE/OUTPT VISIT, EST, LEVL III, 20-29 MIN: ICD-10-PCS | Mod: S$GLB,,, | Performed by: FAMILY MEDICINE

## 2023-05-06 PROCEDURE — 87811 SARS-COV-2 COVID19 W/OPTIC: CPT | Mod: QW,S$GLB,, | Performed by: FAMILY MEDICINE

## 2023-05-06 PROCEDURE — 99213 OFFICE O/P EST LOW 20 MIN: CPT | Mod: S$GLB,,, | Performed by: FAMILY MEDICINE

## 2023-05-06 RX ORDER — AMOXICILLIN 875 MG/1
875 TABLET, FILM COATED ORAL 2 TIMES DAILY
Qty: 20 TABLET | Refills: 0 | Status: SHIPPED | OUTPATIENT
Start: 2023-05-06 | End: 2023-05-08

## 2023-05-06 RX ORDER — ACETAMINOPHEN 160 MG
5 TABLET,CHEWABLE ORAL DAILY
Qty: 240 ML | Refills: 3 | Status: SHIPPED | OUTPATIENT
Start: 2023-05-06 | End: 2024-05-05

## 2023-05-06 NOTE — PROGRESS NOTES
Subjective:      Patient ID: Mingo Kahn is a 52 y.o. male.    Vitals:  height is 6' (1.829 m) and weight is 130.6 kg (288 lb). His oral temperature is 97.9 °F (36.6 °C). His blood pressure is 123/79 and his pulse is 81. His respiration is 18 and oxygen saturation is 96%.     Chief Complaint: Sinus Problem    C/o sore throat, sinus congestion x 2 weeks, worsening over 5 days,  No fever or chills  Now with greenish phlegm      Sinus Problem  This is a new problem. The current episode started in the past 7 days (4 days ago). The problem has been gradually worsening since onset. There has been no fever. His pain is at a severity of 2/10. The pain is mild. Associated symptoms include congestion, a hoarse voice, sinus pressure and a sore throat. Pertinent negatives include no coughing, ear pain or headaches. Treatments tried: advil. The treatment provided mild relief.     HENT:  Positive for congestion, sinus pressure and sore throat. Negative for ear pain.    Respiratory:  Negative for cough.    Neurological:  Negative for headaches.    Objective:     Physical Exam   Constitutional: He is oriented to person, place, and time. He appears well-developed. He is cooperative.   HENT:   Head: Normocephalic and atraumatic.   Ears:   Right Ear: Hearing, tympanic membrane, external ear and ear canal normal.   Left Ear: Hearing, tympanic membrane, external ear and ear canal normal.   Nose: Nose normal. No mucosal edema or nasal deformity. No epistaxis. Right sinus exhibits no maxillary sinus tenderness and no frontal sinus tenderness. Left sinus exhibits no maxillary sinus tenderness and no frontal sinus tenderness.   Mouth/Throat: Uvula is midline, oropharynx is clear and moist and mucous membranes are normal. No trismus in the jaw. Normal dentition. No uvula swelling.   Eyes: Conjunctivae and lids are normal.   Neck: Trachea normal and phonation normal. Neck supple.   Cardiovascular: Normal rate, regular rhythm, normal  heart sounds and normal pulses.   Pulmonary/Chest: Effort normal and breath sounds normal.   Abdominal: Normal appearance and bowel sounds are normal. Soft.   Musculoskeletal: Normal range of motion.         General: Normal range of motion.   Neurological: He is alert and oriented to person, place, and time. He exhibits normal muscle tone.   Skin: Skin is warm, dry and intact.   Psychiatric: His speech is normal and behavior is normal. Judgment and thought content normal.   Nursing note and vitals reviewed.    Assessment:     1. Encounter for screening laboratory testing for COVID-19 virus        Plan:       Encounter for screening laboratory testing for COVID-19 virus  -     SARS Coronavirus 2 Antigen, POCT Manual Read

## 2023-05-08 ENCOUNTER — OFFICE VISIT (OUTPATIENT)
Dept: URGENT CARE | Facility: CLINIC | Age: 53
End: 2023-05-08
Payer: COMMERCIAL

## 2023-05-08 VITALS
WEIGHT: 288 LBS | BODY MASS INDEX: 39.01 KG/M2 | TEMPERATURE: 99 F | HEIGHT: 72 IN | RESPIRATION RATE: 19 BRPM | SYSTOLIC BLOOD PRESSURE: 144 MMHG | HEART RATE: 94 BPM | DIASTOLIC BLOOD PRESSURE: 90 MMHG | OXYGEN SATURATION: 96 %

## 2023-05-08 DIAGNOSIS — H66.002 NON-RECURRENT ACUTE SUPPURATIVE OTITIS MEDIA OF LEFT EAR WITHOUT SPONTANEOUS RUPTURE OF TYMPANIC MEMBRANE: Primary | ICD-10-CM

## 2023-05-08 DIAGNOSIS — J32.9 SINUSITIS, UNSPECIFIED CHRONICITY, UNSPECIFIED LOCATION: ICD-10-CM

## 2023-05-08 PROCEDURE — 99213 OFFICE O/P EST LOW 20 MIN: CPT | Mod: S$GLB,,, | Performed by: NURSE PRACTITIONER

## 2023-05-08 PROCEDURE — 99213 PR OFFICE/OUTPT VISIT, EST, LEVL III, 20-29 MIN: ICD-10-PCS | Mod: S$GLB,,, | Performed by: NURSE PRACTITIONER

## 2023-05-08 RX ORDER — AMOXICILLIN AND CLAVULANATE POTASSIUM 875; 125 MG/1; MG/1
1 TABLET, FILM COATED ORAL 2 TIMES DAILY
Qty: 20 TABLET | Refills: 0 | Status: SHIPPED | OUTPATIENT
Start: 2023-05-08 | End: 2023-05-18

## 2023-05-08 RX ORDER — IPRATROPIUM BROMIDE 21 UG/1
1 SPRAY, METERED NASAL 2 TIMES DAILY
Qty: 30 ML | Refills: 0 | Status: SHIPPED | OUTPATIENT
Start: 2023-05-08 | End: 2023-05-15

## 2023-05-08 NOTE — PROGRESS NOTES
Subjective:      Patient ID: Mingo Kahn is a 52 y.o. male.    Vitals:  height is 6' (1.829 m) and weight is 130.6 kg (288 lb). His temperature is 98.7 °F (37.1 °C). His blood pressure is 144/90 (abnormal) and his pulse is 94. His respiration is 19 and oxygen saturation is 96%.     Chief Complaint: Tinnitus (Bilateral Pulsating and ringing in his ears )    Pt present with bilateral pulsating and ringing in his ears X 2 days. Pt was seen here on saturday and was diagnosed with a sinus infection and is still taking the medication that was prescribed. Pt stated on Saturday he didn't have this problem with his ears.     Provider note begins below:    Patient comes to the clinic with new onset of left ear pain after being seen and diagnosed with bacterial sinusitis 2 days ago.  Patient is currently taking amoxicillin and has not had any relief over the last 2 days.  We discussed increasing to a broad-spectrum, Augmentin, and patient is amenable to trialing this medication.    Ringing in Ears:   Chronicity:  New  Progression since onset:  Rapidly improving  Frequency:  Constantly  Pain scale:  8/10  Severity:  Severe   Associated symptoms: Tinnitus.  No dizziness and no fever.  Aggravated by:  Nothing  Treatments tried:  NothingNo dizziness.    Constitution: Negative for fever.   HENT:  Positive for tinnitus.    Neurological:  Negative for dizziness.    Objective:     Physical Exam   Constitutional: He is oriented to person, place, and time. He appears well-developed. He is cooperative.  Non-toxic appearance. He does not appear ill. No distress.   HENT:   Head: Normocephalic and atraumatic.   Ears:   Right Ear: Hearing, tympanic membrane, external ear and ear canal normal.   Left Ear: Hearing, external ear and ear canal normal. There is swelling and tenderness. Tympanic membrane is injected and bulging. Tympanic membrane is not perforated. A middle ear effusion is present.   Ears:    Nose: Rhinorrhea present. No  mucosal edema or nasal deformity. No epistaxis.   Mouth/Throat: Uvula is midline, oropharynx is clear and moist and mucous membranes are normal. No trismus in the jaw. Normal dentition. No uvula swelling. Cobblestoning present. No oropharyngeal exudate, posterior oropharyngeal edema or posterior oropharyngeal erythema.   Eyes: Conjunctivae and lids are normal. No scleral icterus.   Neck: Trachea normal and phonation normal. Neck supple. No edema present. No erythema present. No neck rigidity present.   Cardiovascular: Normal rate and normal pulses.   Pulmonary/Chest: Effort normal and breath sounds normal. No respiratory distress.   Abdominal: Normal appearance.   Musculoskeletal: Normal range of motion.         General: No deformity. Normal range of motion.   Neurological: He is alert and oriented to person, place, and time. He exhibits normal muscle tone. Coordination normal.   Skin: Skin is warm, dry, intact, not diaphoretic and not pale.   Psychiatric: His speech is normal and behavior is normal. Judgment and thought content normal.   Nursing note and vitals reviewed.    Assessment:     1. Non-recurrent acute suppurative otitis media of left ear without spontaneous rupture of tympanic membrane    2. Sinusitis, unspecified chronicity, unspecified location        Plan:     Patient will discontinue amoxicillin and begin taking Augmentin.  Also given ipratropium bromide for some sinus congestion and postnasal drip relief.    Non-recurrent acute suppurative otitis media of left ear without spontaneous rupture of tympanic membrane  -     amoxicillin-clavulanate 875-125mg (AUGMENTIN) 875-125 mg per tablet; Take 1 tablet by mouth 2 (two) times daily. for 10 days  Dispense: 20 tablet; Refill: 0    Sinusitis, unspecified chronicity, unspecified location  -     ipratropium (ATROVENT) 21 mcg (0.03 %) nasal spray; 1 spray by Each Nostril route 2 (two) times daily. for 7 days  Dispense: 30 mL; Refill: 0

## 2023-07-22 ENCOUNTER — HOSPITAL ENCOUNTER (EMERGENCY)
Facility: HOSPITAL | Age: 53
Discharge: HOME | End: 2023-07-22
Attending: EMERGENCY MEDICINE
Payer: COMMERCIAL

## 2023-07-22 VITALS
DIASTOLIC BLOOD PRESSURE: 82 MMHG | TEMPERATURE: 100 F | BODY MASS INDEX: 35.94 KG/M2 | WEIGHT: 265 LBS | SYSTOLIC BLOOD PRESSURE: 135 MMHG | RESPIRATION RATE: 18 BRPM | HEART RATE: 99 BPM | OXYGEN SATURATION: 97 %

## 2023-07-22 DIAGNOSIS — R05.9 COUGH: Primary | ICD-10-CM

## 2023-07-22 DIAGNOSIS — J40 BRONCHITIS: ICD-10-CM

## 2023-07-22 LAB
GROUP A STREP, MOLECULAR: NEGATIVE
INFLUENZA A, MOLECULAR: NEGATIVE
INFLUENZA B, MOLECULAR: NEGATIVE
SARS-COV-2 RDRP RESP QL NAA+PROBE: NEGATIVE
SPECIMEN SOURCE: NORMAL

## 2023-07-22 PROCEDURE — 87651 STREP A DNA AMP PROBE: CPT | Performed by: EMERGENCY MEDICINE

## 2023-07-22 PROCEDURE — 63600175 PHARM REV CODE 636 W HCPCS: Performed by: EMERGENCY MEDICINE

## 2023-07-22 PROCEDURE — 99284 EMERGENCY DEPT VISIT MOD MDM: CPT | Mod: 25

## 2023-07-22 PROCEDURE — 25000003 PHARM REV CODE 250: Performed by: EMERGENCY MEDICINE

## 2023-07-22 PROCEDURE — 87502 INFLUENZA DNA AMP PROBE: CPT | Performed by: EMERGENCY MEDICINE

## 2023-07-22 PROCEDURE — 96372 THER/PROPH/DIAG INJ SC/IM: CPT | Performed by: EMERGENCY MEDICINE

## 2023-07-22 PROCEDURE — U0002 COVID-19 LAB TEST NON-CDC: HCPCS | Performed by: EMERGENCY MEDICINE

## 2023-07-22 RX ORDER — AZITHROMYCIN 250 MG/1
TABLET, FILM COATED ORAL
Qty: 6 TABLET | Refills: 0 | Status: SHIPPED | OUTPATIENT
Start: 2023-07-22 | End: 2023-07-27

## 2023-07-22 RX ORDER — IBUPROFEN 600 MG/1
600 TABLET ORAL
Status: COMPLETED | OUTPATIENT
Start: 2023-07-22 | End: 2023-07-22

## 2023-07-22 RX ORDER — DEXAMETHASONE SODIUM PHOSPHATE 4 MG/ML
8 INJECTION, SOLUTION INTRA-ARTICULAR; INTRALESIONAL; INTRAMUSCULAR; INTRAVENOUS; SOFT TISSUE
Status: COMPLETED | OUTPATIENT
Start: 2023-07-22 | End: 2023-07-22

## 2023-07-22 RX ORDER — IBUPROFEN 600 MG/1
600 TABLET ORAL EVERY 6 HOURS PRN
Qty: 20 TABLET | Refills: 0 | Status: SHIPPED | OUTPATIENT
Start: 2023-07-22

## 2023-07-22 RX ADMIN — DEXAMETHASONE SODIUM PHOSPHATE 8 MG: 4 INJECTION, SOLUTION INTRA-ARTICULAR; INTRALESIONAL; INTRAMUSCULAR; INTRAVENOUS; SOFT TISSUE at 09:07

## 2023-07-22 RX ADMIN — IBUPROFEN 600 MG: 600 TABLET, FILM COATED ORAL at 09:07

## 2023-07-22 NOTE — ED NOTES
Pt presents to ED from nasal congestion, fever, HA since Tuesday.    Patient identifiers verified by spelling and stated name on armband along with .    Review of patient's allergies indicates:   Allergen Reactions    Bactrim [sulfamethoxazole-trimethoprim]        APPEARANCE: +ill, fatigued.  NEURO: +HA, 5/5 strength major flexors/extensors bilaterally. Sensory intact to light touch bilaterally. Bryan coma scale: eyes open spontaneously-4, oriented & converses-5, obeys commands-6. No neurological abnormalities. Denies dizziness, photophobia.  HEENT: +nasal congestion, no drainage noted.  CARDIAC: Normal rate and rhythm through apical/radial pulse, S1 and S2 noted, denies CP.   RESPIRATORY:Normal rate and effort, breath sounds clear bilaterally throughout chest anterior and posterior. Respirations are equal and unlabored, no obvious signs of distress. No SOB reported.  PERIPHERAL VASCULAR: +2 peripheral pulses present. Normal cap refill <3sec. No edema. Warm to touch.   GASTRO: Abdomen soft, flat, bowel sounds normal and active in all 4 quadrants, no tenderness, no abdominal distention. Denies NVD.  MUSC: Full ROM. No bony tenderness or soft tissue tenderness. No obvious deformity.  SKIN: Skin is warm and dry, normal skin turgor, mucous membranes moist.   GENITOURINARY: Voids spontaneously, denies itching, burning, hematuria.

## 2023-07-22 NOTE — ED PROVIDER NOTES
Encounter Date: 7/22/2023       History     Chief Complaint   Patient presents with    Fever     Pt reports fever and nasal congestion since last Tuesday. Pt has sinus pressure and reports the drainage has been bloody at times. Pt has tried alternating tylenol and motrin but none today. Pt denies any nausea, vomiting or diarrhea.      Patient is a 52-year-old male complains of a diffuse headache and body aches.  He also reports subjective fevers.  These symptoms began 4 days ago.  He denies nausea or vomiting.  He has had an occasional cough that is sometimes blood-tinged.  Patient also began with nonbloody diarrhea.  No known sick contacts.    Review of patient's allergies indicates:   Allergen Reactions    Bactrim [sulfamethoxazole-trimethoprim]      Past Medical History:   Diagnosis Date    Diverticulitis     Ulcerative (chronic) enterocolitis      Past Surgical History:   Procedure Laterality Date    COLONOSCOPY W/ BIOPSIES  2015     Family History   Problem Relation Age of Onset    Hyperlipidemia Mother     Hypertension Mother     Cancer Father 71        Pancreatic Cancer    Stroke Maternal Grandmother     Heart attack Maternal Grandmother     Stomach cancer Paternal Grandmother     Prostate cancer Maternal Uncle     Prostate cancer Maternal Uncle     Throat cancer Maternal Uncle     Stomach cancer Paternal Uncle     Breast cancer Paternal Aunt     Breast cancer Maternal Aunt      Social History     Tobacco Use    Smoking status: Every Day     Packs/day: 0.25     Years: 30.00     Pack years: 7.50     Types: Cigarettes    Smokeless tobacco: Never   Substance Use Topics    Alcohol use: Yes     Comment: weekends (3 drinks max)    Drug use: Not Currently     Review of Systems   Constitutional:  Positive for fatigue and fever.   Gastrointestinal:  Positive for diarrhea. Negative for blood in stool.   Neurological:  Positive for headaches.     Physical Exam     Initial Vitals [07/22/23 0725]   BP Pulse Resp Temp SpO2    (!) 150/77 (!) 112 18 99.7 °F (37.6 °C) 96 %      MAP       --         Physical Exam    Nursing note and vitals reviewed.  Constitutional: No distress.   HENT:   Head: Normocephalic and atraumatic.   Minimal posterior pharyngeal erythema without exudate.   Eyes: EOM are normal. Pupils are equal, round, and reactive to light.   Neck: Neck supple.   Normal range of motion.  Cardiovascular:  Normal rate, regular rhythm and normal heart sounds.           Pulmonary/Chest: Breath sounds normal.   Abdominal: Abdomen is soft. There is no rebound and no guarding.   Musculoskeletal:         General: Normal range of motion.      Cervical back: Normal range of motion and neck supple.     Neurological: He is alert and oriented to person, place, and time. No cranial nerve deficit.   Skin: Skin is warm and dry.   Psychiatric: He has a normal mood and affect.       ED Course   Procedures  Labs Reviewed   INFLUENZA A & B BY MOLECULAR   GROUP A STREP, MOLECULAR   SARS-COV-2 RNA AMPLIFICATION, QUAL          Imaging Results              X-Ray Chest PA And Lateral (Final result)  Result time 07/22/23 08:41:16      Final result by Marcelo Jay MD (07/22/23 08:41:16)                   Impression:      No acute findings.      Electronically signed by: Marcelo Jay MD  Date:    07/22/2023  Time:    08:41               Narrative:    EXAMINATION:  XR CHEST PA AND LATERAL    CLINICAL HISTORY:  Cough, unspecified    TECHNIQUE:  PA and lateral views of the chest were performed.    COMPARISON:  None    FINDINGS:  The cardiomediastinal contour is within normal limits.  The lungs are clear.  No pneumothorax.  No pleural effusions.                                       Medications   dexAMETHasone injection 8 mg (8 mg Intramuscular Given 7/22/23 0903)   ibuprofen tablet 600 mg (600 mg Oral Given 7/22/23 0903)     Medical Decision Making:   Initial Assessment:   52-year-old male with viral like symptoms.  Differential Diagnosis:   Viral syndrome,  COVID-19 infection, influenza, bronchitis, pneumonia.  Independently Interpreted Test(s):   I have ordered and independently interpreted X-rays - see summary below.       <> Summary of X-Ray Reading(s): Chest x-ray without infiltrates.  Clinical Tests:   Lab Tests: Ordered and Reviewed       <> Summary of Lab: Influenza, COVID-19 and strep swabs are negative.  ED Management:  Negative viral swabs.  Patient was given a shot of Decadron here in the ED. He should follow up with his primary physician for recheck when able but may return to the emergency department for any possible worsening.                        Clinical Impression:   Final diagnoses:  [R05.9] Cough (Primary)  [J40] Bronchitis        ED Disposition Condition    Discharge Stable          ED Prescriptions       Medication Sig Dispense Start Date End Date Auth. Provider    ibuprofen (ADVIL,MOTRIN) 600 MG tablet Take 1 tablet (600 mg total) by mouth every 6 (six) hours as needed for Pain. 20 tablet 7/22/2023 -- Mark Bass MD    azithromycin (Z-DIANE) 250 MG tablet Take 2 tablets by mouth on day 1; Take 1 tablet by mouth on days 2-5 6 tablet 7/22/2023 7/27/2023 Mark Bass MD          Follow-up Information       Follow up With Specialties Details Why Contact Info    Nicko Elias MD Family Medicine  As needed [address] [facility]  [address]  [place] [address]  [phone]      [facility] - Emergency Dept Emergency Medicine  If symptoms worsen [address] [address] [phone]             Mark Bass MD  07/22/23 3817

## 2023-07-23 PROCEDURE — 99284 EMERGENCY DEPT VISIT MOD MDM: CPT | Mod: 25

## 2023-07-24 ENCOUNTER — HOSPITAL ENCOUNTER (EMERGENCY)
Facility: HOSPITAL | Age: 53
Discharge: HOME | End: 2023-07-24
Attending: EMERGENCY MEDICINE
Payer: COMMERCIAL

## 2023-07-24 VITALS
WEIGHT: 260 LBS | SYSTOLIC BLOOD PRESSURE: 123 MMHG | TEMPERATURE: 99 F | DIASTOLIC BLOOD PRESSURE: 79 MMHG | BODY MASS INDEX: 35.26 KG/M2 | RESPIRATION RATE: 18 BRPM | OXYGEN SATURATION: 95 % | HEART RATE: 84 BPM

## 2023-07-24 DIAGNOSIS — B34.9 VIRAL SYNDROME: Primary | ICD-10-CM

## 2023-07-24 LAB
ALBUMIN SERPL BCP-MCNC: 3 G/DL (ref 3.5–5.2)
ALP SERPL-CCNC: 96 U/L (ref 55–135)
ALT SERPL W/O P-5'-P-CCNC: 35 U/L (ref 10–44)
ANION GAP SERPL CALC-SCNC: 13 MMOL/L (ref 8–16)
AST SERPL-CCNC: 32 U/L (ref 10–40)
BASOPHILS # BLD AUTO: 0.07 K/UL (ref 0–0.2)
BASOPHILS NFR BLD: 0.7 % (ref 0–1.9)
BILIRUB SERPL-MCNC: 0.4 MG/DL (ref 0.1–1)
BUN SERPL-MCNC: 13 MG/DL (ref 6–20)
CALCIUM SERPL-MCNC: 8.5 MG/DL (ref 8.7–10.5)
CHLORIDE SERPL-SCNC: 102 MMOL/L (ref 95–110)
CO2 SERPL-SCNC: 18 MMOL/L (ref 23–29)
CREAT SERPL-MCNC: 1.2 MG/DL (ref 0.5–1.4)
DIFFERENTIAL METHOD: ABNORMAL
EOSINOPHIL # BLD AUTO: 0.1 K/UL (ref 0–0.5)
EOSINOPHIL NFR BLD: 0.7 % (ref 0–8)
ERYTHROCYTE [DISTWIDTH] IN BLOOD BY AUTOMATED COUNT: 14.1 % (ref 11.5–14.5)
EST. GFR  (NO RACE VARIABLE): >60 ML/MIN/1.73 M^2
GLUCOSE SERPL-MCNC: 102 MG/DL (ref 70–110)
HCT VFR BLD AUTO: 51.8 % (ref 40–54)
HGB BLD-MCNC: 17 G/DL (ref 14–18)
IMM GRANULOCYTES # BLD AUTO: 0.05 K/UL (ref 0–0.04)
IMM GRANULOCYTES NFR BLD AUTO: 0.5 % (ref 0–0.5)
LYMPHOCYTES # BLD AUTO: 3.1 K/UL (ref 1–4.8)
LYMPHOCYTES NFR BLD: 28.5 % (ref 18–48)
MCH RBC QN AUTO: 27.6 PG (ref 27–31)
MCHC RBC AUTO-ENTMCNC: 32.8 G/DL (ref 32–36)
MCV RBC AUTO: 84 FL (ref 82–98)
MONOCYTES # BLD AUTO: 1 K/UL (ref 0.3–1)
MONOCYTES NFR BLD: 9.3 % (ref 4–15)
NEUTROPHILS # BLD AUTO: 6.5 K/UL (ref 1.8–7.7)
NEUTROPHILS NFR BLD: 60.3 % (ref 38–73)
NRBC BLD-RTO: 0 /100 WBC
PLATELET # BLD AUTO: 246 K/UL (ref 150–450)
PMV BLD AUTO: 10 FL (ref 9.2–12.9)
POTASSIUM SERPL-SCNC: 3.7 MMOL/L (ref 3.5–5.1)
PROT SERPL-MCNC: 7 G/DL (ref 6–8.4)
RBC # BLD AUTO: 6.15 M/UL (ref 4.6–6.2)
SODIUM SERPL-SCNC: 133 MMOL/L (ref 136–145)
WBC # BLD AUTO: 10.73 K/UL (ref 3.9–12.7)

## 2023-07-24 PROCEDURE — 80053 COMPREHEN METABOLIC PANEL: CPT | Performed by: EMERGENCY MEDICINE

## 2023-07-24 PROCEDURE — 96374 THER/PROPH/DIAG INJ IV PUSH: CPT

## 2023-07-24 PROCEDURE — 63600175 PHARM REV CODE 636 W HCPCS

## 2023-07-24 PROCEDURE — 96375 TX/PRO/DX INJ NEW DRUG ADDON: CPT

## 2023-07-24 PROCEDURE — 85025 COMPLETE CBC W/AUTO DIFF WBC: CPT

## 2023-07-24 RX ORDER — DIPHENHYDRAMINE HYDROCHLORIDE 50 MG/ML
25 INJECTION INTRAMUSCULAR; INTRAVENOUS
Status: COMPLETED | OUTPATIENT
Start: 2023-07-24 | End: 2023-07-24

## 2023-07-24 RX ORDER — PROCHLORPERAZINE EDISYLATE 5 MG/ML
10 INJECTION INTRAMUSCULAR; INTRAVENOUS
Status: COMPLETED | OUTPATIENT
Start: 2023-07-24 | End: 2023-07-24

## 2023-07-24 RX ADMIN — PROCHLORPERAZINE EDISYLATE 10 MG: 5 INJECTION INTRAMUSCULAR; INTRAVENOUS at 01:07

## 2023-07-24 RX ADMIN — DIPHENHYDRAMINE HYDROCHLORIDE 25 MG: 50 INJECTION, SOLUTION INTRAMUSCULAR; INTRAVENOUS at 01:07

## 2023-07-24 RX ADMIN — SODIUM CHLORIDE, POTASSIUM CHLORIDE, SODIUM LACTATE AND CALCIUM CHLORIDE 1000 ML: 600; 310; 30; 20 INJECTION, SOLUTION INTRAVENOUS at 02:07

## 2023-07-24 NOTE — DISCHARGE INSTRUCTIONS
As discussed, your symptoms likely represent a viral illness. Unfortunately there are no specific medicines to make the illness end faster. Antibiotics do not work for viral illnesses because they do not kill viruses.    Tests today showed:   Labs Reviewed   CBC W/ AUTO DIFFERENTIAL - Abnormal; Notable for the following components:       Result Value    Immature Grans (Abs) 0.05 (*)     All other components within normal limits     Imaging Results    None         Treatments you had today:   Medications   lactated ringers bolus 1,000 mL (1,000 mLs Intravenous New Bag 7/24/23 0224)   prochlorperazine injection Soln 10 mg (10 mg Intravenous Given 7/24/23 0159)   diphenhydrAMINE injection 25 mg (25 mg Intravenous Given 7/24/23 0159)       Home Care Instructions:  - Take acetaminophen (Tylenol) or ibuprofen (Advil, Motrin) for with fevers, aches and pains   - Stay well hydrated and well rested  - Continue taking your home medications as prescribed    Follow-up plan:  - Follow-up with: Primary care doctor within 3 - 5 days  - Follow-up for additional testing and/or evaluation as directed by your primary doctor    Return to the Emergency Department for symptoms including but not limited to: worsening symptoms, severe headache, shortness of breath or chest pain, worsening cough with thick yellow/green sputum, vomiting with inability to hold down fluids, fevers greater than 100.4°F, dizziness, passing out/fainting/unconsciousness, rash, symptoms persisting beyond 7 days, or other concerning symptoms.

## 2023-07-24 NOTE — ED NOTES
Patient waiting for IV fluids to finish to discharge, provider already spoke to patient bedside, discharge instructions and education provided.

## 2023-07-24 NOTE — ED PROVIDER NOTES
Encounter Date: 7/23/2023       History     Chief Complaint   Patient presents with    Fever     X4 days. Tmax earlier 102.3. Been taking tylenol and ibuprofen around the clock. Last took 2hrs pta. Seen at ED yesterday and was told everything was negative      52-year-old male with a past medical history of UC and diverticulitis presents with a chief complaint of fever for the past 5 days.  He says that he has been taking Tylenol and ibuprofen for the fever.  He also has associated headache and cough with occasional clear sputum with blood streaks.  He is also had 2 days of diarrhea.  He was seen in the emergency department yesterday and was given Decadron and a Z-Thomas, flu COVID and strep were all negative.  The patient is denying any chest pain, shortness of breath, vomiting or abdominal pain.    The history is provided by the patient. No  was used.   Review of patient's allergies indicates:   Allergen Reactions    Bactrim [sulfamethoxazole-trimethoprim]      Past Medical History:   Diagnosis Date    Diverticulitis     Ulcerative (chronic) enterocolitis      Past Surgical History:   Procedure Laterality Date    COLONOSCOPY W/ BIOPSIES  2015     Family History   Problem Relation Age of Onset    Hyperlipidemia Mother     Hypertension Mother     Cancer Father 71        Pancreatic Cancer    Stroke Maternal Grandmother     Heart attack Maternal Grandmother     Stomach cancer Paternal Grandmother     Prostate cancer Maternal Uncle     Prostate cancer Maternal Uncle     Throat cancer Maternal Uncle     Stomach cancer Paternal Uncle     Breast cancer Paternal Aunt     Breast cancer Maternal Aunt      Social History     Tobacco Use    Smoking status: Every Day     Packs/day: 0.25     Years: 30.00     Pack years: 7.50     Types: Cigarettes    Smokeless tobacco: Never   Substance Use Topics    Alcohol use: Yes     Comment: weekends (3 drinks max)    Drug use: Not Currently     Review of Systems    Physical  Exam     Initial Vitals [07/23/23 2310]   BP Pulse Resp Temp SpO2   139/78 105 18 99.7 °F (37.6 °C) (!) 93 %      MAP       --         Physical Exam    Nursing note and vitals reviewed.  Constitutional: He appears well-developed and well-nourished. He is not diaphoretic. No distress.   HENT:   Head: Normocephalic and atraumatic.   Mouth/Throat: No oropharyngeal exudate.   There is no pharyngeal erythema   Eyes: EOM are normal. Pupils are equal, round, and reactive to light.   Neck: Neck supple.   Normal range of motion.  Cardiovascular:  Normal rate, regular rhythm, normal heart sounds and intact distal pulses.           Pulmonary/Chest: Breath sounds normal. He has no wheezes. He has no rhonchi. He has no rales.   Abdominal: Abdomen is soft. There is no abdominal tenderness. There is no rebound and no guarding.   Musculoskeletal:         General: No tenderness or edema. Normal range of motion.      Cervical back: Normal range of motion and neck supple.     Lymphadenopathy:     He has no cervical adenopathy.   Neurological: He is alert and oriented to person, place, and time. He has normal strength.   Skin: Skin is warm and dry. Capillary refill takes less than 2 seconds. No rash noted. No erythema. No pallor.   Psychiatric: He has a normal mood and affect. His behavior is normal. Judgment and thought content normal.       ED Course   Procedures  Labs Reviewed   CBC W/ AUTO DIFFERENTIAL - Abnormal; Notable for the following components:       Result Value    Immature Grans (Abs) 0.05 (*)     All other components within normal limits   COMPREHENSIVE METABOLIC PANEL - Abnormal; Notable for the following components:    Sodium 133 (*)     CO2 18 (*)     Calcium 8.5 (*)     Albumin 3.0 (*)     All other components within normal limits          Imaging Results    None          Medications   prochlorperazine injection Soln 10 mg (10 mg Intravenous Given 7/24/23 0159)   lactated ringers bolus 1,000 mL (1,000 mLs Intravenous  New Bag 7/24/23 0224)   diphenhydrAMINE injection 25 mg (25 mg Intravenous Given 7/24/23 0159)     Medical Decision Making:   History:   Old Medical Records: I decided to obtain old medical records.  Old Records Summarized: records from clinic visits and records from previous admission(s).       <> Summary of Records: Prior records reviewed for past medical history and current medications  Initial Assessment:   52-year-old male in no acute distress.  Patient appears well hydrated, nontoxic.  Patient is currently afebrile.  I treated his headache with Compazine and Benadryl and IV fluids.  Differential Diagnosis:   URI vs bronchitis doubt sinusitis, pt has no facial tenderness, doubt meningitis, patient does not have any nuchal rigidity, appears nontoxic, has been febrile for 5 days  Clinical Tests:   Lab Tests: Reviewed  ED Management:  Patient says that his headache was improving after receiving medications.  Patient does not have any leukocytosis, I advised that his symptoms are mostly a viral syndrome.  I said that it is fine for him to continue taking his Z-Thomas as prescribed and I discussed return precautions with him.  I answered all questions, provided discharge paperwork and the patient was discharged in stable condition.                        Clinical Impression:   Final diagnoses:  [B34.9] Viral syndrome (Primary)        ED Disposition Condition    Discharge Stable          ED Prescriptions    None       Follow-up Information       Follow up With Specialties Details Why Contact Info    Nicko Elias MD Family Medicine  As needed, If symptoms worsen [address]  [address]  [facility] [address]  [phone]               Martinez Gamez MD  Resident  07/24/23 6335

## 2023-07-24 NOTE — ED NOTES
Patient identifiers for Mingo Kahn checked and correct.    Mingo Kahn, a 52 y.o. male presents to the ED w/ complaint of fever for the past 5 days. Pt. Stated that highest temp in the past week was 102.3. Pt. Visited another ER yesterday, where he tested negative for Flu, Strep, and Covid. Pt. States taking Ibuprofen for fever around 11pm on 7/23. Pts. current temperature is 98.4. Pt. claims other symptoms are headache and tingly hands bilaterally. Pt. denies nausea, dizziness, weakness, SOB.    Triage note:  Chief Complaint   Patient presents with    Fever     X4 days. Tmax earlier 102.3. Been taking tylenol and ibuprofen around the clock. Last took 2hrs pta. Seen at ED yesterday and was told everything was negative      Review of patient's allergies indicates:   Allergen Reactions    Bactrim [sulfamethoxazole-trimethoprim]      Past Medical History:   Diagnosis Date    Diverticulitis     Ulcerative (chronic) enterocolitis       LOC: The patient is awake, alert and aware of environment with an appropriate affect, the patient is oriented x 4 and speaking appropriately.    APPEARANCE: Patient resting comfortably and in no acute distress, patient is clean and well groomed, patient's clothing is properly fastened.    SKIN: The skin is warm and dry, color consistent with ethnicity, patient has normal skin turgor and moist mucus membranes, skin intact, no breakdown or bruising noted.    MUSCULOSKELETAL: Patient moving all extremities well, no obvious swelling or deformities noted.    RESPIRATORY: Airway is open and patent, respirations are spontaneous and even, patient has a normal effort and rate.    CARDIAC: Patient has a normal rate and rhythm, no periphreal edema noted, capillary refill < 3 seconds.    ABDOMEN: Soft and non tender to palpation, no distention noted. Patient denies any nausea, vomiting, diarrhea, or constipation.     NEUROLOGIC: Eyes open spontaneously, PERRL, behavior appropriate to  situation, follows commands, facial expression symmetrical, bilateral hand grasp equal and even, purposeful motor response noted, normal sensation in all extremities.     HEENT: No abnormalities noted. White sclera and pupils equal round and reactive to light. Denies dizziness. Pt.     : Pt voids independently, denies dysuria, hematuria, frequency.

## 2024-12-13 ENCOUNTER — PATIENT OUTREACH (OUTPATIENT)
Dept: ADMINISTRATIVE | Facility: OTHER | Age: 54
End: 2024-12-13
Payer: COMMERCIAL

## 2024-12-13 NOTE — PROGRESS NOTES
CHW - Outreach Attempt    Community Health Worker left a voicemail message for 1st attempt to contact patient regarding: SDOH completion and assessment   Community Health Worker to attempt to contact patient on: 12/23/24

## 2024-12-19 ENCOUNTER — PATIENT MESSAGE (OUTPATIENT)
Dept: ADMINISTRATIVE | Facility: OTHER | Age: 54
End: 2024-12-19
Payer: COMMERCIAL

## 2024-12-20 ENCOUNTER — LAB VISIT (OUTPATIENT)
Dept: LAB | Facility: HOSPITAL | Age: 54
End: 2024-12-20
Attending: NURSE PRACTITIONER
Payer: COMMERCIAL

## 2024-12-20 ENCOUNTER — OFFICE VISIT (OUTPATIENT)
Dept: PRIMARY CARE CLINIC | Facility: CLINIC | Age: 54
End: 2024-12-20
Payer: COMMERCIAL

## 2024-12-20 VITALS
RESPIRATION RATE: 18 BRPM | HEART RATE: 78 BPM | OXYGEN SATURATION: 97 % | WEIGHT: 280.31 LBS | BODY MASS INDEX: 37.97 KG/M2 | HEIGHT: 72 IN | SYSTOLIC BLOOD PRESSURE: 112 MMHG | DIASTOLIC BLOOD PRESSURE: 79 MMHG

## 2024-12-20 DIAGNOSIS — Z00.00 ENCOUNTER FOR WELLNESS EXAMINATION IN ADULT: ICD-10-CM

## 2024-12-20 DIAGNOSIS — E66.01 SEVERE OBESITY (BMI 35.0-35.9 WITH COMORBIDITY): ICD-10-CM

## 2024-12-20 DIAGNOSIS — Z13.220 SCREENING CHOLESTEROL LEVEL: ICD-10-CM

## 2024-12-20 DIAGNOSIS — F17.200 TOBACCO DEPENDENCE: ICD-10-CM

## 2024-12-20 DIAGNOSIS — G47.00 INSOMNIA, UNSPECIFIED TYPE: ICD-10-CM

## 2024-12-20 DIAGNOSIS — Z11.59 ENCOUNTER FOR HEPATITIS C SCREENING TEST FOR LOW RISK PATIENT: ICD-10-CM

## 2024-12-20 DIAGNOSIS — Z11.3 SCREEN FOR STD (SEXUALLY TRANSMITTED DISEASE): ICD-10-CM

## 2024-12-20 DIAGNOSIS — Z91.89 AT HIGH RISK FOR CARDIOVASCULAR DISEASE: ICD-10-CM

## 2024-12-20 DIAGNOSIS — B35.3 TINEA PEDIS OF BOTH FEET: ICD-10-CM

## 2024-12-20 DIAGNOSIS — Z13.1 SCREENING FOR DIABETES MELLITUS: ICD-10-CM

## 2024-12-20 DIAGNOSIS — Z13.29 SCREENING FOR THYROID DISORDER: ICD-10-CM

## 2024-12-20 DIAGNOSIS — Z11.4 SCREENING FOR HIV (HUMAN IMMUNODEFICIENCY VIRUS): ICD-10-CM

## 2024-12-20 DIAGNOSIS — E11.9 TYPE 2 DIABETES MELLITUS WITHOUT COMPLICATION, WITHOUT LONG-TERM CURRENT USE OF INSULIN: Primary | ICD-10-CM

## 2024-12-20 DIAGNOSIS — Z12.5 SCREENING PSA (PROSTATE SPECIFIC ANTIGEN): ICD-10-CM

## 2024-12-20 DIAGNOSIS — Z86.39 HISTORY OF VITAMIN D DEFICIENCY: ICD-10-CM

## 2024-12-20 LAB
25(OH)D3+25(OH)D2 SERPL-MCNC: 27 NG/ML (ref 30–96)
ALBUMIN SERPL BCP-MCNC: 3.9 G/DL (ref 3.5–5.2)
ALP SERPL-CCNC: 120 U/L (ref 40–150)
ALT SERPL W/O P-5'-P-CCNC: 47 U/L (ref 10–44)
ANION GAP SERPL CALC-SCNC: 9 MMOL/L (ref 8–16)
AST SERPL-CCNC: 36 U/L (ref 10–40)
BASOPHILS # BLD AUTO: 0.07 K/UL (ref 0–0.2)
BASOPHILS NFR BLD: 1.4 % (ref 0–1.9)
BILIRUB SERPL-MCNC: 0.6 MG/DL (ref 0.1–1)
BILIRUB UR QL STRIP: NEGATIVE
BUN SERPL-MCNC: 11 MG/DL (ref 6–20)
CALCIUM SERPL-MCNC: 9.7 MG/DL (ref 8.7–10.5)
CHLORIDE SERPL-SCNC: 106 MMOL/L (ref 95–110)
CHOLEST SERPL-MCNC: 146 MG/DL (ref 120–199)
CHOLEST/HDLC SERPL: 4.2 {RATIO} (ref 2–5)
CLARITY UR REFRACT.AUTO: CLEAR
CO2 SERPL-SCNC: 25 MMOL/L (ref 23–29)
COLOR UR AUTO: YELLOW
COMPLEXED PSA SERPL-MCNC: 0.47 NG/ML (ref 0–4)
CREAT SERPL-MCNC: 1.2 MG/DL (ref 0.5–1.4)
DIFFERENTIAL METHOD BLD: ABNORMAL
EOSINOPHIL # BLD AUTO: 0.2 K/UL (ref 0–0.5)
EOSINOPHIL NFR BLD: 4.3 % (ref 0–8)
ERYTHROCYTE [DISTWIDTH] IN BLOOD BY AUTOMATED COUNT: 13.5 % (ref 11.5–14.5)
EST. GFR  (NO RACE VARIABLE): >60 ML/MIN/1.73 M^2
ESTIMATED AVG GLUCOSE: 174 MG/DL (ref 68–131)
GLUCOSE SERPL-MCNC: 147 MG/DL (ref 70–110)
GLUCOSE UR QL STRIP: NEGATIVE
HBA1C MFR BLD: 7.7 % (ref 4–5.6)
HCT VFR BLD AUTO: 52.1 % (ref 40–54)
HCV AB SERPL QL IA: NORMAL
HDLC SERPL-MCNC: 35 MG/DL (ref 40–75)
HDLC SERPL: 24 % (ref 20–50)
HGB BLD-MCNC: 16.1 G/DL (ref 14–18)
HGB UR QL STRIP: NEGATIVE
HIV 1+2 AB+HIV1 P24 AG SERPL QL IA: NORMAL
IMM GRANULOCYTES # BLD AUTO: 0.01 K/UL (ref 0–0.04)
IMM GRANULOCYTES NFR BLD AUTO: 0.2 % (ref 0–0.5)
KETONES UR QL STRIP: NEGATIVE
LDLC SERPL CALC-MCNC: 98.6 MG/DL (ref 63–159)
LEUKOCYTE ESTERASE UR QL STRIP: NEGATIVE
LYMPHOCYTES # BLD AUTO: 2 K/UL (ref 1–4.8)
LYMPHOCYTES NFR BLD: 38.3 % (ref 18–48)
MCH RBC QN AUTO: 27.6 PG (ref 27–31)
MCHC RBC AUTO-ENTMCNC: 30.9 G/DL (ref 32–36)
MCV RBC AUTO: 89 FL (ref 82–98)
MONOCYTES # BLD AUTO: 0.4 K/UL (ref 0.3–1)
MONOCYTES NFR BLD: 7.5 % (ref 4–15)
NEUTROPHILS # BLD AUTO: 2.5 K/UL (ref 1.8–7.7)
NEUTROPHILS NFR BLD: 48.3 % (ref 38–73)
NITRITE UR QL STRIP: NEGATIVE
NONHDLC SERPL-MCNC: 111 MG/DL
NRBC BLD-RTO: 0 /100 WBC
PH UR STRIP: 5 [PH] (ref 5–8)
PLATELET # BLD AUTO: 243 K/UL (ref 150–450)
PMV BLD AUTO: 10.9 FL (ref 9.2–12.9)
POTASSIUM SERPL-SCNC: 5.2 MMOL/L (ref 3.5–5.1)
PROT SERPL-MCNC: 7.4 G/DL (ref 6–8.4)
PROT UR QL STRIP: NEGATIVE
RBC # BLD AUTO: 5.84 M/UL (ref 4.6–6.2)
SODIUM SERPL-SCNC: 140 MMOL/L (ref 136–145)
SP GR UR STRIP: 1.02 (ref 1–1.03)
T4 FREE SERPL-MCNC: 0.91 NG/DL (ref 0.71–1.51)
TREPONEMA PALLIDUM IGG+IGM AB [PRESENCE] IN SERUM OR PLASMA BY IMMUNOASSAY: NONREACTIVE
TRIGL SERPL-MCNC: 62 MG/DL (ref 30–150)
TSH SERPL DL<=0.005 MIU/L-ACNC: 0.81 UIU/ML (ref 0.4–4)
URN SPEC COLLECT METH UR: NORMAL
WBC # BLD AUTO: 5.09 K/UL (ref 3.9–12.7)

## 2024-12-20 PROCEDURE — 87389 HIV-1 AG W/HIV-1&-2 AB AG IA: CPT | Performed by: NURSE PRACTITIONER

## 2024-12-20 PROCEDURE — 80053 COMPREHEN METABOLIC PANEL: CPT | Performed by: NURSE PRACTITIONER

## 2024-12-20 PROCEDURE — 83036 HEMOGLOBIN GLYCOSYLATED A1C: CPT | Performed by: NURSE PRACTITIONER

## 2024-12-20 PROCEDURE — 87491 CHLMYD TRACH DNA AMP PROBE: CPT | Performed by: NURSE PRACTITIONER

## 2024-12-20 PROCEDURE — 80061 LIPID PANEL: CPT | Performed by: NURSE PRACTITIONER

## 2024-12-20 PROCEDURE — 84443 ASSAY THYROID STIM HORMONE: CPT | Performed by: NURSE PRACTITIONER

## 2024-12-20 PROCEDURE — 84439 ASSAY OF FREE THYROXINE: CPT | Performed by: NURSE PRACTITIONER

## 2024-12-20 PROCEDURE — 86593 SYPHILIS TEST NON-TREP QUANT: CPT | Performed by: NURSE PRACTITIONER

## 2024-12-20 PROCEDURE — 84153 ASSAY OF PSA TOTAL: CPT | Performed by: NURSE PRACTITIONER

## 2024-12-20 PROCEDURE — 36415 COLL VENOUS BLD VENIPUNCTURE: CPT | Performed by: NURSE PRACTITIONER

## 2024-12-20 PROCEDURE — 82306 VITAMIN D 25 HYDROXY: CPT | Performed by: NURSE PRACTITIONER

## 2024-12-20 PROCEDURE — 87086 URINE CULTURE/COLONY COUNT: CPT | Performed by: NURSE PRACTITIONER

## 2024-12-20 PROCEDURE — 81003 URINALYSIS AUTO W/O SCOPE: CPT | Performed by: NURSE PRACTITIONER

## 2024-12-20 PROCEDURE — 99999 PR PBB SHADOW E&M-EST. PATIENT-LVL V: CPT | Mod: PBBFAC,,, | Performed by: NURSE PRACTITIONER

## 2024-12-20 PROCEDURE — 87661 TRICHOMONAS VAGINALIS AMPLIF: CPT | Performed by: NURSE PRACTITIONER

## 2024-12-20 PROCEDURE — 85025 COMPLETE CBC W/AUTO DIFF WBC: CPT | Performed by: NURSE PRACTITIONER

## 2024-12-20 PROCEDURE — 86803 HEPATITIS C AB TEST: CPT | Performed by: NURSE PRACTITIONER

## 2024-12-20 RX ORDER — UREA 40 %
CREAM (GRAM) TOPICAL 2 TIMES DAILY
Qty: 85 G | Refills: 1 | Status: SHIPPED | OUTPATIENT
Start: 2024-12-20 | End: 2024-12-30

## 2024-12-20 NOTE — PROGRESS NOTES
Subjective:       Patient ID: Mingo Kahn is a 54 y.o. male.    Chief Complaint: Headache (After eating)    History of Present Illness  CHIEF COMPLAINT:  Patient presents today for follow-up.    SLEEP:  He reports chronic insomnia for 8-9 years, sleeping approximately four hours per night with one nocturia episode.    MEDICATIONS:  He was prescribed cholesterol medication but is not taking it. He takes multivitamins, vitamin D, B12, and B-complex supplements.    DIET AND EXERCISE:  His diet consists of fish, pasta, and vegetables, and he avoids fried foods. He reports irregular meal patterns with frequent breakfast skipping. He walks 2-3 times per week for 30-40 minutes at a park.    SKIN:  He has dry, cracked, flaky skin on feet, itchy patch between toes, unpleasant odor, previously diagnosed as a fungal infection. He denies any foot sores.    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 His wife is  due to kidney failure.    SUBSTANCE USE:  He reports occasional alcohol use, preferring brown liquor. He is a current smoker with previous quit attempts.     Past Medical History:   Diagnosis Date    Diverticulitis     Ulcerative (chronic) enterocolitis         Past Surgical History:   Procedure Laterality Date    COLONOSCOPY W/ BIOPSIES          Family History   Problem Relation Name Age of Onset    Hyperlipidemia Mother      Hypertension Mother      Cancer Father  71        Pancreatic Cancer    Stroke Maternal Grandmother      Heart attack Maternal Grandmother      Stomach cancer Paternal Grandmother      Prostate cancer Maternal Uncle      Prostate cancer Maternal Uncle      Throat cancer Maternal Uncle      Stomach cancer Paternal Uncle      Breast cancer Paternal Aunt      Breast cancer Maternal Aunt         Social History     Tobacco Use   Smoking Status Every Day    Current packs/day: 0.25    Average packs/day: 0.3 packs/day for 30.0 years (7.5 ttl pk-yrs)    Types:  Cigarettes   Smokeless Tobacco Never       Social History     Social History Narrative    EtOH: 1-2 beers and 1-2 mixed drinks on weekends     Drug: None    Employment: Construction; 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 Wife passed 2021    3 children        Review of patient's allergies indicates:   Allergen Reactions    Bactrim [sulfamethoxazole-trimethoprim]         Review of Systems   Constitutional:  Positive for unexpected weight change. Negative for activity change.   HENT:  Negative for hearing loss, rhinorrhea and trouble swallowing.    Eyes:  Positive for visual disturbance. Negative for discharge.   Respiratory:  Negative for chest tightness and wheezing.    Cardiovascular:  Negative for chest pain and palpitations.   Gastrointestinal:  Negative for blood in stool, constipation, diarrhea and vomiting.   Endocrine: Negative for polydipsia and polyuria.   Genitourinary:  Negative for difficulty urinating, hematuria and urgency.   Musculoskeletal:  Negative for joint swelling and neck pain.   Neurological:  Positive for headaches. Negative for weakness.   Psychiatric/Behavioral:  Negative for confusion and dysphoric mood.          Objective:      Vitals:    12/20/24 0805   BP: 112/79   BP Location: Right arm   Patient Position: Sitting   Pulse: 78   Resp: 18   SpO2: 97%   Weight: 127.2 kg (280 lb 5 oz)   Height: 6' (1.829 m)      Physical Exam  Constitutional:       Appearance: Normal appearance.   Musculoskeletal:         General: Normal range of motion.      Cervical back: Normal range of motion.   Skin:     General: Skin is warm and dry.   Neurological:      Mental Status: He is alert.         Assessment:       1. Type 2 diabetes mellitus without complication, without long-term current use of insulin    2. Encounter for wellness examination in adult    3. Severe obesity (BMI 35.0-35.9 with comorbidity)    4. Screening cholesterol level    5. Encounter for hepatitis C  screening test for low risk patient    6. Screening for diabetes mellitus    7. Screening for HIV (human immunodeficiency virus)    8. Screening for thyroid disorder    9. Screening PSA (prostate specific antigen)    10. Tobacco dependence    11. Screen for STD (sexually transmitted disease)    12. History of vitamin D deficiency    13. Tinea pedis of both feet    14. At high risk for cardiovascular disease    15. Insomnia, unspecified type        Plan:       Type 2 diabetes mellitus without complication, without long-term current use of insulin  Stable, diet and exercise.    Encounter for wellness examination in adult  Counseled patient on importance of health prevention screening, immunizations, and overall wellness.   Immunizations reviewed.    -     CBC Auto Differential; Future; Expected date: 12/20/2024  -     Comprehensive Metabolic Panel; Future; Expected date: 12/20/2024  -     Vitamin D; Future; Expected date: 12/20/2024  -     Urinalysis  -     CULTURE, URINE    Severe obesity (BMI 35.0-35.9 with comorbidity)    Screening cholesterol level  -     Lipid Panel; Future; Expected date: 12/20/2024    Encounter for hepatitis C screening test for low risk patient  -     Hepatitis C Antibody; Future; Expected date: 12/20/2024    Screening for diabetes mellitus  -     Hemoglobin A1C; Future; Expected date: 12/20/2024    Screening for HIV (human immunodeficiency virus)  -     HIV 1/2 Ag/Ab (4th Gen); Future; Expected date: 12/20/2024    Screening for thyroid disorder  -     T4, Free; Future; Expected date: 12/20/2024  -     TSH; Future; Expected date: 12/20/2024    Screening PSA (prostate specific antigen)  -     PSA, Screening; Future; Expected date: 12/20/2024    Tobacco dependence    Screen for STD (sexually transmitted disease)  -     Trichomonas vaginalis, RNA, Qual, Urine  -     C. trachomatis/N. gonorrheae by AMP DNA  -     Treponema Pallidium Antibodies IgG, IgM; Future; Expected date: 12/20/2024    History  of vitamin D deficiency    Tinea pedis of both feet  -     urea (CARMOL) 40 % Crea; Apply topically 2 (two) times daily. for 10 days  Dispense: 85 g; Refill: 1      At high risk for cardiovascular disease  Smoker    Insomnia, unspecified type  Referral to [facility].       Assessment & Plan    TYPE 2 DIABETES MELLITUS:  - Emphasized the importance of proper nutrition and regular meals for overall health.  - Discussed the importance of proper foot care and moisturizing.    INSOMNIA:  - Referred patient for sleep study to evaluate insomnia and potential sleep apnea.    DEPRESSION AND GRIEF:  - Discussed the benefits of therapy for coping with loss and grief.    TOBACCO USE:  - Patient to consider smoking cessation.    LIFESTYLE AND DIET:  - Recommend starting walks 2-3 times per week for 30-40 minutes in the park.  - Patient to reduce consumption of fried foods.  - Recommend decreasing alcohol consumption, particularly brown liquor.  - Recommend improving diet by including regular meals, especially breakfast.  - Patient to incorporate protein shakes and fruit for breakfast.  - Continued multivitamin gummies.  - Continued vitamin D supplements.  - Continued B-complex supplements.    GENERAL HEALTH EXAMINATION:  - Educated on the importance of regular health check-ups and annual labs.  - Comprehensive baseline labs ordered.  - STD testing ordered.  - Blood pressure monitoring ordered for the next few weeks.    FOLLOW-UP:  - Follow up on January 10th in the morning to review labs results and reassess blood pressure.  - Follow up in 2-3 weeks for reassessment.  - Contact the office with blood pressure monitoring results from home over the next few weeks.      Medication List with Changes/Refills   New Medications    UREA (CARMOL) 40 % CREA    Apply topically 2 (two) times daily. for 10 days   Current Medications    KETOCONAZOLE (NIZORAL) 2 % CREAM    Apply topically once daily.    LORATADINE (CLARITIN) 5 MG/5  ML SYRUP    Take 5 mLs (5 mg total) by mouth once daily.    ROSUVASTATIN (CRESTOR) 5 MG TABLET    Take 1 tablet (5 mg total) by mouth every evening.    VITAMIN D (VITAMIN D3) 1000 UNITS TAB    Take 1,000 Units by mouth once daily.   Discontinued Medications    IBUPROFEN (ADVIL,MOTRIN) 600 MG TABLET    Take 1 tablet (600 mg total) by mouth every 6 (six) hours as needed for Pain.        Follow up in about 3 weeks (around 1/10/2025).    I spent a total of 45 minutes on the day of the visit.This includes face to face time and non-face to face time preparing to see the patient (eg, review of tests), obtaining and/or reviewing separately obtained history, documenting clinical information in the electronic or other health record, independently interpreting results and communicating results to the patient/family/caregiver, or care coordinator.     GARTH Collins, MSN, FNP-C     This note was generated with the assistance of ambient listening technology. Verbal consent was obtained by the patient and accompanying visitor(s) for the recording of patient appointment to facilitate this note. I attest to having reviewed and edited the generated note for accuracy, though some syntax or spelling errors may persist. Please contact the author of this note for any clarification.

## 2024-12-20 NOTE — PATIENT INSTRUCTIONS
Please get your labs done at any [facility] facility that has a laboratory, you do not need an appointment.     I will communicate your laboratory and/or imaging results with you through your [facility] account that was set up through the portal.    [facility]   [address] [place] [address]      Monitor and record blood pressure twice a day for the next few weeks, follow up in office. Once in the morning after waking and urinating, then at bedtime.

## 2024-12-26 DIAGNOSIS — E11.9 TYPE 2 DIABETES MELLITUS WITHOUT COMPLICATION, WITHOUT LONG-TERM CURRENT USE OF INSULIN: Primary | ICD-10-CM

## 2024-12-26 RX ORDER — SEMAGLUTIDE 0.68 MG/ML
0.25 INJECTION, SOLUTION SUBCUTANEOUS
Qty: 3 ML | Refills: 0 | Status: SHIPPED | OUTPATIENT
Start: 2024-12-26

## 2025-01-10 ENCOUNTER — OFFICE VISIT (OUTPATIENT)
Dept: PRIMARY CARE CLINIC | Facility: CLINIC | Age: 55
End: 2025-01-10
Payer: COMMERCIAL

## 2025-01-10 VITALS
BODY MASS INDEX: 37.89 KG/M2 | HEIGHT: 72 IN | HEART RATE: 78 BPM | DIASTOLIC BLOOD PRESSURE: 82 MMHG | SYSTOLIC BLOOD PRESSURE: 119 MMHG | OXYGEN SATURATION: 96 % | WEIGHT: 279.75 LBS

## 2025-01-10 DIAGNOSIS — Z72.0 TOBACCO ABUSE: ICD-10-CM

## 2025-01-10 DIAGNOSIS — Z76.0 ENCOUNTER FOR MEDICATION REFILL: ICD-10-CM

## 2025-01-10 DIAGNOSIS — H57.89 CONTACT LENS STUCK: ICD-10-CM

## 2025-01-10 DIAGNOSIS — Z91.89 AT HIGH RISK FOR CARDIOVASCULAR DISEASE: ICD-10-CM

## 2025-01-10 DIAGNOSIS — E66.01 SEVERE OBESITY (BMI 35.0-39.9) WITH COMORBIDITY: ICD-10-CM

## 2025-01-10 DIAGNOSIS — B35.3 TINEA PEDIS OF BOTH FEET: ICD-10-CM

## 2025-01-10 DIAGNOSIS — E11.9 TYPE 2 DIABETES MELLITUS WITHOUT COMPLICATION, WITHOUT LONG-TERM CURRENT USE OF INSULIN: Primary | ICD-10-CM

## 2025-01-10 LAB
ALBUMIN/CREAT UR: 2.4 UG/MG (ref 0–30)
CREAT UR-MCNC: 286 MG/DL (ref 23–375)
MICROALBUMIN UR DL<=1MG/L-MCNC: 7 UG/ML

## 2025-01-10 PROCEDURE — 82043 UR ALBUMIN QUANTITATIVE: CPT | Performed by: NURSE PRACTITIONER

## 2025-01-10 PROCEDURE — 99999 PR PBB SHADOW E&M-EST. PATIENT-LVL III: CPT | Mod: PBBFAC,,, | Performed by: NURSE PRACTITIONER

## 2025-01-10 RX ORDER — CICLOPIROX OLAMINE 7.7 MG/G
CREAM TOPICAL 2 TIMES DAILY
Qty: 15 G | Refills: 0 | Status: SHIPPED | OUTPATIENT
Start: 2025-01-10 | End: 2025-01-24

## 2025-01-10 RX ORDER — ROSUVASTATIN CALCIUM 5 MG/1
5 TABLET, COATED ORAL NIGHTLY
Qty: 90 TABLET | Refills: 3 | Status: SHIPPED | OUTPATIENT
Start: 2025-01-10 | End: 2026-01-10

## 2025-01-10 RX ORDER — TIRZEPATIDE 2.5 MG/.5ML
2.5 INJECTION, SOLUTION SUBCUTANEOUS
Qty: 0.5 ML | Refills: 0 | Status: SHIPPED | OUTPATIENT
Start: 2025-01-10

## 2025-01-10 NOTE — PROCEDURES
"Foreign body removal    Date/Time: 1/10/2025 9:00 AM    Performed by: Tiffani Jo NP  Authorized by: Tiffani Jo NP  Consent Done: Yes  Consent: Verbal consent obtained.  Consent given by: patient  Patient understanding: patient states understanding of the procedure being performed  Patient consent: the patient's understanding of the procedure matches consent given  Procedure consent: procedure consent matches procedure scheduled  Relevant documents: relevant documents present and verified  Test results: test results available and properly labeled  Site marked: the operative site was marked  Imaging studies: imaging studies available  Patient identity confirmed: , name, verbally with patient and provided demographic data  Time out: Immediately prior to procedure a "time out" was called to verify the correct patient, procedure, equipment, support staff and site/side marked as required.  Body area: eye  Location details: right cornea  Anesthesia: see MAR for details (no anestheshia)    Patient sedated: no  Patient restrained: no  Removal mechanism: irrigation  Number of foreign bodies recovered: contact lens torn, remaining on cornea.      "

## 2025-01-10 NOTE — PROGRESS NOTES
Subjective:       Patient ID: Mingo Kahn is a 54 y.o. male.    Chief Complaint: Blood Pressure Follow-up    History of Present Illness    CHIEF COMPLAINT:  Patient presents today for follow-up regarding diabetes management and medication issues.    DIABETES MANAGEMENT:  His hemoglobin A1c is 7.7. He is experiencing insurance pre-authorization delays with Ozempic prescription. He is transitioning to Jordans due to GI issues from metformin. Urinalysis was normal, though microalbumin testing is pending.    MEDICATIONS:  He is not taking his prescribed cholesterol medication. He reports difficulties obtaining foot cream prescription due to pre-authorization requirements.    EYE CONCERNS:  He reports a broken contact lens with concern for retained piece in eye and possible corneal scratch.    SOCIAL HISTORY:  He smokes approximately one pack per week.    SURGICAL HISTORY:  He has a deviated septum with previous denial for surgical correction.            Past Medical History:   Diagnosis Date    Diverticulitis     Ulcerative (chronic) enterocolitis         Past Surgical History:   Procedure Laterality Date    COLONOSCOPY W/ BIOPSIES  2015        Family History   Problem Relation Name Age of Onset    Hyperlipidemia Mother      Hypertension Mother      Cancer Father  71        Pancreatic Cancer    Stroke Maternal Grandmother      Heart attack Maternal Grandmother      Stomach cancer Paternal Grandmother      Prostate cancer Maternal Uncle      Prostate cancer Maternal Uncle      Throat cancer Maternal Uncle      Stomach cancer Paternal Uncle      Breast cancer Paternal Aunt      Breast cancer Maternal Aunt         Social History     Tobacco Use   Smoking Status Every Day    Current packs/day: 0.25    Average packs/day: 0.3 packs/day for 30.0 years (7.5 ttl pk-yrs)    Types: Cigarettes   Smokeless Tobacco Never       Social History     Social History Narrative    EtOH: 1-2 beers and 1-2 mixed drinks on weekends      Drug: None    Employment: Construction; 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 Wife passed 2021    3 children        Review of patient's allergies indicates:   Allergen Reactions    Bactrim [sulfamethoxazole-trimethoprim]         Review of Systems   Eyes:  Positive for redness.   Respiratory:  Negative for chest tightness and shortness of breath.    Cardiovascular:  Negative for chest pain and palpitations.   Gastrointestinal:  Negative for nausea and vomiting.   Neurological:  Negative for dizziness, light-headedness and headaches.          Objective:      Vitals:    01/10/25 0911   BP: 119/82   BP Location: Right arm   Patient Position: Sitting   Pulse: 78   SpO2: 96%   Weight: 126.9 kg (279 lb 12.2 oz)   Height: 6' (1.829 m)      Physical Exam  Constitutional:       Appearance: Normal appearance.   Eyes:      General:         Right eye: No discharge.      Comments: Slight redness to right eye states it's from contact lens   Pulmonary:      Effort: Pulmonary effort is normal. No respiratory distress.   Neurological:      Mental Status: He is alert and oriented to person, place, and time.         Assessment:       1. Type 2 diabetes mellitus without complication, without long-term current use of insulin    2. Encounter for medication refill    3. Tinea pedis of both feet        Plan:       Type 2 diabetes mellitus without complication, without long-term current use of insulin  -     empagliflozin (JARDIANCE) 10 mg tablet; Take 1 tablet (10 mg total) by mouth once daily. for 90 doses  Dispense: 90 tablet; Refill: 0  -     tirzepatide (MOUNJARO) 2.5 mg/0.5 mL PnIj; Inject 2.5 mg into the skin every 7 days.  Dispense: 0.5 mL; Refill: 0  -     Microalbumin/creatinine urine ratio  -     rosuvastatin (CRESTOR) 5 MG tablet; Take 1 tablet (5 mg total) by mouth every evening.  Dispense: 90 tablet; Refill: 3    At high risk for cardiovascular disease     Tobacco abuse     Encounter for  medication refill    Tinea pedis of both feet  -     ciclopirox (LOPROX) 0.77 % Crea; Apply topically 2 (two) times daily. for 14 days  Dispense: 15 g; Refill: 0      Assessment & Plan  OBESITY:  - Initiated Mounjaro (tirzepatide) for diabetes management and weight loss, pending prior authorization.  - Monitored BMI and evaluated cardiovascular risk, noting a 17% chance of heart attack or stroke.  - Discussed medication options and recommended lifestyle changes, including smoking cessation.  - Scheduled 3-month follow-up to reassess weight management progress.    DIABETES:  - Initiated Jardiance (empagliflozin), a once-daily pill for diabetes management.  - Ordered a urine microalbumin test to check for protein related to diabetes.  - Monitored hemoglobin A1c, currently 7.7, indicating hyperglycemia.  - Discussed the importance of improving blood sugar control with the patient.    HYPERLIPIDEMIA:  - Educated the patient about the benefits of cholesterol medication for reducing cardiovascular risk.  - Initiated cholesterol medication, to be taken at nighttime.  - Monitored the patient's adherence to previously prescribed cholesterol medication.    TOBACCO USE:  - Monitored the patient's current smoking habit, about a pack of cigarettes per week.  - Evaluated the impact of smoking on cardiovascular risk and overall health, especially with diabetes.  - Counseled on the need to reduce smoking and work towards quitting.  - Discussed the impact on the patient's health.    DIETARY ADVICE:  - Reinforced previous dietary advice to avoid fried, fast, and spicy foods, recommend heart healthy diet.  - Advised against adding hot sauce and extra seasoning to meals.    FOOT CARE:  - Discussed importance of foot care.    HYPERTENSION:  - Patient to continue monitoring blood pressure at home.    MEDICATIONS/SUPPLEMENTS:  - Started new medication for fungus (pending insurance approval).     Medication List with Changes/Refills   New  Medications    CICLOPIROX (LOPROX) 0.77 % CREA    Apply topically 2 (two) times daily. for 14 days    EMPAGLIFLOZIN (JARDIANCE) 10 MG TABLET    Take 1 tablet (10 mg total) by mouth once daily. for 90 doses    TIRZEPATIDE (MOUNJARO) 2.5 MG/0.5 ML PNIJ    Inject 2.5 mg into the skin every 7 days.   Current Medications    VITAMIN D (VITAMIN D3) 1000 UNITS TAB    Take 1,000 Units by mouth once daily.   Changed and/or Refilled Medications    Modified Medication Previous Medication    ROSUVASTATIN (CRESTOR) 5 MG TABLET rosuvastatin (CRESTOR) 5 MG tablet       Take 1 tablet (5 mg total) by mouth every evening.    Take 1 tablet (5 mg total) by mouth every evening.   Discontinued Medications    KETOCONAZOLE (NIZORAL) 2 % CREAM    Apply topically once daily.    LORATADINE (CLARITIN) 5 MG/5 ML SYRUP    Take 5 mLs (5 mg total) by mouth once daily.    SEMAGLUTIDE (OZEMPIC) 0.25 MG OR 0.5 MG (2 MG/3 ML) PEN INJECTOR    Inject 0.25 mg into the skin every 7 days.            Follow up in about 3 months (around 4/10/2025) for Tiffani Jo, MSN, APRN, FNP-C.    I spent a total of 49 minutes on the day of the visit.This includes face to face time and non-face to face time preparing to see the patient (eg, review of tests), obtaining and/or reviewing separately obtained history, documenting clinical information in the electronic or other health record, independently interpreting results and communicating results to the patient/family/caregiver, or care coordinator.       GARTH Collins, MSN, FNP-C     This note was generated with the assistance of ambient listening technology. Verbal consent was obtained by the patient and accompanying visitor(s) for the recording of patient appointment to facilitate this note. I attest to having reviewed and edited the generated note for accuracy, though some syntax or spelling errors may persist. Please contact the author of this note for any clarification.

## 2025-02-19 DIAGNOSIS — B35.3 TINEA PEDIS OF BOTH FEET: ICD-10-CM

## 2025-02-21 RX ORDER — CICLOPIROX OLAMINE 7.7 MG/G
CREAM TOPICAL 2 TIMES DAILY
Qty: 15 G | Refills: 0 | Status: SHIPPED | OUTPATIENT
Start: 2025-02-21 | End: 2025-03-07

## 2025-04-17 ENCOUNTER — OFFICE VISIT (OUTPATIENT)
Dept: URGENT CARE | Facility: CLINIC | Age: 55
End: 2025-04-17
Payer: COMMERCIAL

## 2025-04-17 VITALS
BODY MASS INDEX: 37.79 KG/M2 | SYSTOLIC BLOOD PRESSURE: 123 MMHG | DIASTOLIC BLOOD PRESSURE: 84 MMHG | TEMPERATURE: 98 F | WEIGHT: 279 LBS | RESPIRATION RATE: 16 BRPM | OXYGEN SATURATION: 99 % | HEIGHT: 72 IN | HEART RATE: 74 BPM

## 2025-04-17 DIAGNOSIS — H10.13 ALLERGIC CONJUNCTIVITIS OF BOTH EYES: Primary | ICD-10-CM

## 2025-04-17 DIAGNOSIS — Z91.09 ENVIRONMENTAL ALLERGIES: ICD-10-CM

## 2025-04-17 DIAGNOSIS — F17.200 NEEDS SMOKING CESSATION EDUCATION: ICD-10-CM

## 2025-04-17 PROCEDURE — 99213 OFFICE O/P EST LOW 20 MIN: CPT | Mod: S$GLB,,, | Performed by: FAMILY MEDICINE

## 2025-04-17 RX ORDER — PREDNISOLONE ACETATE 10 MG/ML
1 SUSPENSION/ DROPS OPHTHALMIC 2 TIMES DAILY
Qty: 5 ML | Refills: 0 | Status: SHIPPED | OUTPATIENT
Start: 2025-04-17 | End: 2025-04-27

## 2025-04-17 RX ORDER — EMPAGLIFLOZIN 10 MG/1
10 TABLET, FILM COATED ORAL
COMMUNITY
Start: 2025-02-11

## 2025-04-17 NOTE — PROGRESS NOTES
Subjective:      Patient ID: Mingo Khan is a 54 y.o. male.    Vitals:  height is 6' (1.829 m) and weight is 126.6 kg (279 lb). His oral temperature is 98.2 °F (36.8 °C). His blood pressure is 123/84 and his pulse is 74. His respiration is 16 and oxygen saturation is 99%.     Chief Complaint: Eye Problem    This is a 54 y.o. male with a chief complaint of LT eye problem.     Sx started yesterday and are worsening.     Sx include photophobia, pain in LT eyelid (7/10), mild swelling, redness.     Pt has taken water flushes, eye drops for sx with mild relief.     Eye Problem   The left eye is affected. This is a new problem. The current episode started yesterday. The problem occurs constantly. The problem has been gradually worsening. The pain is at a severity of 7/10. The pain is moderate. There is No known exposure to pink eye. He Wears contacts. Associated symptoms include eye redness and photophobia. He has tried water and eye drops for the symptoms. The treatment provided mild relief.       Eyes:  Positive for eye redness and photophobia.      Objective:     Physical Exam   Constitutional: He is oriented to person, place, and time. He appears well-developed. He is cooperative.   HENT:   Head: Normocephalic and atraumatic.   Ears:   Right Ear: Hearing, external ear and ear canal normal. No swelling. No mastoid tenderness. Tympanic membrane is not erythematous. A middle ear effusion is present.   Left Ear: Hearing, external ear and ear canal normal. No swelling. No mastoid tenderness. Tympanic membrane is not erythematous. A middle ear effusion is present.   Nose: Nose normal. No mucosal edema or nasal deformity. No epistaxis. Right sinus exhibits no maxillary sinus tenderness and no frontal sinus tenderness. Left sinus exhibits no maxillary sinus tenderness and no frontal sinus tenderness.   Mouth/Throat: Uvula is midline and mucous membranes are normal. No trismus in the jaw. Normal dentition. No uvula  swelling. Posterior oropharyngeal edema and posterior oropharyngeal erythema present. No oropharyngeal exudate. Tonsils are 1+ on the right. Tonsils are 1+ on the left. No tonsillar exudate.   Eyes: Lids are normal. Right eye exhibits chemosis. Left eye exhibits chemosis. Right conjunctiva is not injected. Left conjunctiva is injected. periorbital hyperpigmentation  Neck: Trachea normal and phonation normal. Neck supple.   Cardiovascular: Normal rate, regular rhythm, normal heart sounds and normal pulses.   Pulmonary/Chest: Effort normal and breath sounds normal.   Abdominal: Normal appearance and bowel sounds are normal. Soft.   Musculoskeletal: Normal range of motion.         General: Normal range of motion.   Neurological: He is alert and oriented to person, place, and time. He exhibits normal muscle tone.   Skin: Skin is warm, dry and intact.   Psychiatric: His speech is normal and behavior is normal. Judgment and thought content normal.   Nursing note and vitals reviewed.      Assessment:     1. Allergic conjunctivitis of both eyes    2. Environmental allergies        Plan:       Allergic conjunctivitis of both eyes  -     prednisoLONE acetate (PRED FORTE) 1 % DrpS; Place 1 drop into the right eye 2 (two) times daily. for 10 days  Dispense: 5 mL; Refill: 0    Environmental allergies      Thank you for choosing [facility]!     Our goal in the Urgent Care is to always provide outstanding medical care. You may receive a survey by mail or e-mail in the next week regarding your experience today. We would greatly appreciate you completing and returning the survey. Your feedback provides us with a way to recognize our staff who provide very good care, and it helps us learn how to improve when your experience was below our aspiration of excellence.       We appreciate you trusting us with your medical care. We hope you feel better soon. We will be happy to take care of you for all of your future medical  needs.  You must understand that you've received an Urgent Care treatment only and that you may be released before all your medical problems are known or treated. You, the patient, will arrange for follow up care as instructed.  Follow up with your PCP or specialty clinic as directed in the next 1-2 weeks if not improved or as needed.  You can call [phone] to schedule an appointment with the appropriate provider.  Another option is to follow up with [facility] ([email or web address]) virtually for quick simple medical advice.  If your condition worsens we recommend that you receive another evaluation at the emergency room immediately or contact your primary medical clinics after hours call service to discuss your concerns.  Please return here or go to the Emergency Department for any concerns or worsening of condition.      *If you were prescribed a narcotic or controlled medication, do not drive or operate heavy equipment or machinery while taking these medications.

## 2025-05-09 ENCOUNTER — LAB VISIT (OUTPATIENT)
Dept: PRIMARY CARE CLINIC | Facility: CLINIC | Age: 55
End: 2025-05-09
Payer: COMMERCIAL

## 2025-05-09 ENCOUNTER — OFFICE VISIT (OUTPATIENT)
Dept: PRIMARY CARE CLINIC | Facility: CLINIC | Age: 55
End: 2025-05-09
Payer: COMMERCIAL

## 2025-05-09 VITALS
HEIGHT: 72 IN | HEART RATE: 85 BPM | OXYGEN SATURATION: 98 % | WEIGHT: 266.63 LBS | BODY MASS INDEX: 36.11 KG/M2 | DIASTOLIC BLOOD PRESSURE: 79 MMHG | SYSTOLIC BLOOD PRESSURE: 123 MMHG | RESPIRATION RATE: 16 BRPM

## 2025-05-09 DIAGNOSIS — R22.31 PALMAR NODULE, RIGHT: ICD-10-CM

## 2025-05-09 DIAGNOSIS — E11.9 TYPE 2 DIABETES MELLITUS WITHOUT COMPLICATION, WITHOUT LONG-TERM CURRENT USE OF INSULIN: ICD-10-CM

## 2025-05-09 DIAGNOSIS — E87.5 HYPERKALEMIA: ICD-10-CM

## 2025-05-09 DIAGNOSIS — Z00.00 HEALTHCARE MAINTENANCE: ICD-10-CM

## 2025-05-09 DIAGNOSIS — B35.3 TINEA PEDIS OF BOTH FEET: ICD-10-CM

## 2025-05-09 DIAGNOSIS — K51.80 OTHER ULCERATIVE COLITIS WITHOUT COMPLICATION: ICD-10-CM

## 2025-05-09 DIAGNOSIS — E66.01 SEVERE OBESITY (BMI 35.0-39.9) WITH COMORBIDITY: ICD-10-CM

## 2025-05-09 DIAGNOSIS — E11.9 TYPE 2 DIABETES MELLITUS WITHOUT COMPLICATION, WITHOUT LONG-TERM CURRENT USE OF INSULIN: Primary | ICD-10-CM

## 2025-05-09 DIAGNOSIS — Z76.0 ENCOUNTER FOR MEDICATION REFILL: ICD-10-CM

## 2025-05-09 DIAGNOSIS — Z72.0 TOBACCO ABUSE: ICD-10-CM

## 2025-05-09 LAB
ALBUMIN SERPL BCP-MCNC: 4 G/DL (ref 3.5–5.2)
ALP SERPL-CCNC: 119 UNIT/L (ref 40–150)
ALT SERPL W/O P-5'-P-CCNC: 29 UNIT/L (ref 10–44)
ANION GAP (OHS): 11 MMOL/L (ref 8–16)
AST SERPL-CCNC: 26 UNIT/L (ref 11–45)
BILIRUB SERPL-MCNC: 0.6 MG/DL (ref 0.1–1)
BUN SERPL-MCNC: 13 MG/DL (ref 6–20)
CALCIUM SERPL-MCNC: 9.1 MG/DL (ref 8.7–10.5)
CHLORIDE SERPL-SCNC: 105 MMOL/L (ref 95–110)
CO2 SERPL-SCNC: 27 MMOL/L (ref 23–29)
CREAT SERPL-MCNC: 0.9 MG/DL (ref 0.5–1.4)
EAG (OHS): 146 MG/DL (ref 68–131)
GFR SERPLBLD CREATININE-BSD FMLA CKD-EPI: >60 ML/MIN/1.73/M2
GLUCOSE SERPL-MCNC: 124 MG/DL (ref 70–110)
HBA1C MFR BLD: 6.7 % (ref 4–5.6)
POTASSIUM SERPL-SCNC: 4.5 MMOL/L (ref 3.5–5.1)
PROT SERPL-MCNC: 7.7 GM/DL (ref 6–8.4)
SODIUM SERPL-SCNC: 143 MMOL/L (ref 136–145)

## 2025-05-09 PROCEDURE — 80053 COMPREHEN METABOLIC PANEL: CPT

## 2025-05-09 PROCEDURE — 83036 HEMOGLOBIN GLYCOSYLATED A1C: CPT

## 2025-05-09 PROCEDURE — 99999 PR PBB SHADOW E&M-EST. PATIENT-LVL V: CPT | Mod: PBBFAC,,, | Performed by: NURSE PRACTITIONER

## 2025-05-09 RX ORDER — TIRZEPATIDE 2.5 MG/.5ML
2.5 INJECTION, SOLUTION SUBCUTANEOUS
Qty: 0.5 ML | Refills: 0 | Status: SHIPPED | OUTPATIENT
Start: 2025-05-09

## 2025-05-09 RX ORDER — ROSUVASTATIN CALCIUM 5 MG/1
5 TABLET, COATED ORAL NIGHTLY
Qty: 90 TABLET | Refills: 3 | Status: SHIPPED | OUTPATIENT
Start: 2025-05-09 | End: 2026-05-09

## 2025-05-09 RX ORDER — CICLOPIROX OLAMINE 7.7 MG/G
CREAM TOPICAL 2 TIMES DAILY
Qty: 15 G | Refills: 0 | Status: SHIPPED | OUTPATIENT
Start: 2025-05-09 | End: 2025-05-23

## 2025-05-09 RX ORDER — EMPAGLIFLOZIN 10 MG/1
10 TABLET, FILM COATED ORAL DAILY
Qty: 90 TABLET | Refills: 1 | Status: SHIPPED | OUTPATIENT
Start: 2025-05-09 | End: 2025-11-05

## 2025-05-09 NOTE — PROGRESS NOTES
Subjective:       Patient ID: Mingo Kahn is a 54 y.o. male.    Chief Complaint: Diabetes (Follow up. )    History of Present Illness  CHIEF COMPLAINT:  Patient presents today for diabetes follow-up    DIABETES:  Blood sugar readings range from 93 to 216, with elevations noted during fasting periods. He continues Jardiance for management. Insurance issues have prevented access to Mounjaro medication due to insufficient information.    MUSCULOSKELETAL:  He has a right ring finger contracture present for 5 years, causing finger tightening and flexion deformity.    MEDICAL HISTORY:  History of ulcerative colitis from 2022, now stable.     MEDICATIONS AND SUPPLEMENTS:  Takes cholesterol medication nightly. Supplements include Moringa powder, black seed oil, raw black seed, and a daily multivitamin.    SOCIAL HISTORY:  Currently smokes 15 cigarettes weekly, noting decreased desire for cigarettes and ability to go entire workdays without smoking.     Past Medical History:   Diagnosis Date    Diverticulitis     Ulcerative (chronic) enterocolitis         Past Surgical History:   Procedure Laterality Date    COLONOSCOPY W/ BIOPSIES  2015        Family History   Problem Relation Name Age of Onset    Hyperlipidemia Mother      Hypertension Mother      Cancer Father  71        Pancreatic Cancer    Stroke Maternal Grandmother      Heart attack Maternal Grandmother      Stomach cancer Paternal Grandmother      Prostate cancer Maternal Uncle      Prostate cancer Maternal Uncle      Throat cancer Maternal Uncle      Stomach cancer Paternal Uncle      Breast cancer Paternal Aunt      Breast cancer Maternal Aunt         Tobacco Use History[1]    Social History     Social History Narrative    EtOH: 1-2 beers and 1-2 mixed drinks on weekends     Drug: None    Employment: Construction; 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 Wife passed 2021    3 children        Review of patient's allergies  indicates:   Allergen Reactions    Bactrim [sulfamethoxazole-trimethoprim]         Review of Systems   Respiratory:  Negative for chest tightness and shortness of breath.    Cardiovascular:  Negative for chest pain and palpitations.   Gastrointestinal:  Negative for nausea and vomiting.   Neurological:  Negative for dizziness, light-headedness and headaches.         Objective:      Vitals:    05/09/25 0828   BP: 123/79   Pulse: 85   Resp: 16   SpO2: 98%   Weight: 120.9 kg (266 lb 10.3 oz)   Height: 6' (1.829 m)        Physical Exam  Constitutional:       Appearance: Normal appearance.   Pulmonary:      Effort: Pulmonary effort is normal. No respiratory distress.   Neurological:      Mental Status: He is alert and oriented to person, place, and time.         Assessment:       1. Type 2 diabetes mellitus without complication, without long-term current use of insulin    2. Hyperkalemia    3. Tinea pedis of both feet    4. Tobacco abuse    5. Palmar nodule, right    6. Other ulcerative colitis without complication    7. Severe obesity (BMI 35.0-39.9) with comorbidity    8. Healthcare maintenance    9. Encounter for medication refill        Plan:       Type 2 diabetes mellitus without complication, without long-term current use of insulin  -     Hemoglobin A1C; Future; Expected date: 05/09/2025  -     tirzepatide (MOUNJARO) 2.5 mg/0.5 mL PnIj; Inject 2.5 mg into the skin every 7 days.  Dispense: 0.5 mL; Refill: 0  Refilled-JARDIANCE 10 mg tablet; Take 1 tablet (10 mg total) by mouth once daily.  Dispense: 90 tablet; Refill: 1  Refilled-rosuvastatin (CRESTOR) 5 MG tablet; Take 1 tablet (5 mg total) by mouth every evening.  Dispense: 90 tablet; Refill: 3  -     Ambulatory referral/consult to Podiatry; Future; Expected date: 05/16/2025    Hyperkalemia  -     Comprehensive Metabolic Panel; Future; Expected date: 05/09/2025    Tinea pedis of both feet  Refilled-ciclopirox (LOPROX) 0.77 % Crea; Apply topically 2 (two) times  daily. for 14 days  Dispense: 15 g; Refill: 0  -     Ambulatory referral/consult to Podiatry; Future; Expected date: 05/16/2025    Tobacco abuse  -     Ambulatory referral/consult to Smoking Cessation Program; Future; Expected date: 05/09/2025    Palmar nodule, right  -     Ambulatory referral/consult to Hand Surgery; Future; Expected date: 05/16/2025    Other ulcerative colitis without complication  Chronic, stable.    Severe obesity (BMI 35.0-39.9) with comorbidity  Extensive teaching/discussion on obesity, disease management, interventions/treatment.  Recommend and encouraged weight loss, emphasize health benefits.    Weigh loss noted 12/2024-288 pounds to 05/2025-266 pounds.    Healthcare maintenance    Encounter for medication refill       Assessment & Plan    Ulcerative colitis stable.  Diabetes management requires follow-up based on recent A1C from December.  Cholesterol medication adherence and cardiovascular risk (18% chance of stroke or heart attack).  Weight loss progress: reduction from 280 lbs to 266 lbs.  Smoking cessation efforts: reduction to 15 cigarettes per week.    Type 2 diabetes mellitus without complication, without long-term current use of insulin:  - Monitored the patient's blood sugar which range from 93 to 216 with home monitoring.  - A1C was last checked in December and is due for another test today, which has been ordered.  - Discussed insurance issues affecting medication access, specifically Mounjaro and Ozempic, and will order one depending on insurance approval.  - Explained importance of regular meals for diabetics to manage glucose levels and discussed relationship between food intake, insulin production, and glucose regulation.  - Continued Jardiance for diabetes management.  - Referred to podiatrist for annual diabetic foot exam.  - Patient to continue home glucose monitoring.    Palmar nodule, right:  - Monitored the patient's condition which is pulling the finger toward the  palm.  - Patient has had the condition for at least 5 years and it does not cause pain..    Severe obesity (BMI 35.0-39.9) with comorbidity:  - Noted the patient's weight decreased from 280 to 266 lbs.  - Advised the patient to start weight loss injections, expected to facilitate further weight loss.  - Recommend increasing exercise regimen, focusing on both walking and weight training, and maintaining dietary improvements, including cooking more at home.    Tobacco abuse:  - Monitored that the patient smokes about 15 cigarettes a week, increased from a goal of 10.  - Instructed the patient to reduce smoking to 10 or fewer cigarettes per week.  - Planned to initiate smoking cessation therapy.    GENERAL HEALTH RECOMMENDATIONS:  - Patient to take OTC Vitamin D3 supplement, 1000 IU daily.  - Ordered comprehensive labs, including potassium levels.    FOLLOW-UP:  - Follow up in 3 months with Dr. Pollard and the NP.     Medication List with Changes/Refills   New Medications    TIRZEPATIDE (MOUNJARO) 2.5 MG/0.5 ML PNIJ    Inject 2.5 mg into the skin every 7 days.   Current Medications    VITAMIN D (VITAMIN D3) 1000 UNITS TAB    Take 1,000 Units by mouth once daily.   Changed and/or Refilled Medications    Modified Medication Previous Medication    CICLOPIROX (LOPROX) 0.77 % CREA ciclopirox (LOPROX) 0.77 % Crea       Apply topically 2 (two) times daily. for 14 days    Apply topically 2 (two) times daily. for 14 days    JARDIANCE 10 MG TABLET JARDIANCE 10 mg tablet       Take 1 tablet (10 mg total) by mouth once daily.    Take 10 mg by mouth.    ROSUVASTATIN (CRESTOR) 5 MG TABLET rosuvastatin (CRESTOR) 5 MG tablet       Take 1 tablet (5 mg total) by mouth every evening.    Take 1 tablet (5 mg total) by mouth every evening.   Discontinued Medications    TIRZEPATIDE (MOUNJARO) 2.5 MG/0.5 ML PNIJ    Inject 2.5 mg into the skin every 7 days.        Follow up in about 3 months (around 8/9/2025) for Dr. Donnell Pollard  establish care.    I spent a total of 36 minutes on the day of the visit.This includes face to face time and non-face to face time preparing to see the patient (eg, review of tests), obtaining and/or reviewing separately obtained history, documenting clinical information in the electronic or other health record, independently interpreting results and communicating results to the patient/family/caregiver, or care coordinator.     GARTH Collins, MSN, FNP-C     This note was generated with the assistance of ambient listening technology. Verbal consent was obtained by the patient and accompanying visitor(s) for the recording of patient appointment to facilitate this note. I attest to having reviewed and edited the generated note for accuracy, though some syntax or spelling errors may persist. Please contact the author of this note for any clarification.           [1]   Social History  Tobacco Use   Smoking Status Every Day    Current packs/day: 0.25    Average packs/day: 0.3 packs/day for 30.0 years (7.5 ttl pk-yrs)    Types: Cigarettes    Passive exposure: Current   Smokeless Tobacco Never

## 2025-05-09 NOTE — PATIENT INSTRUCTIONS
PLEASE ALLOW UP TO 72 HOURS FOR REVIEW OF YOUR LAB WORK, THANKS IN ADVANCE      Please get your labs done at the laboratory today, once you have checked out for this appointment, we will get you schedule for  labs to be done today.    I will communicate your laboratory and/or imaging results with you through your [facility] account that was set up through the portal, it was a pleasure meeting and taking care of you today.

## 2025-05-13 ENCOUNTER — RESULTS FOLLOW-UP (OUTPATIENT)
Dept: PRIMARY CARE CLINIC | Facility: CLINIC | Age: 55
End: 2025-05-13

## 2025-05-13 DIAGNOSIS — R52 PAIN: Primary | ICD-10-CM

## 2025-05-17 DIAGNOSIS — E11.9 TYPE 2 DIABETES MELLITUS WITHOUT COMPLICATION, WITHOUT LONG-TERM CURRENT USE OF INSULIN: ICD-10-CM

## 2025-05-19 ENCOUNTER — PATIENT MESSAGE (OUTPATIENT)
Dept: PRIMARY CARE CLINIC | Facility: CLINIC | Age: 55
End: 2025-05-19
Payer: COMMERCIAL

## 2025-05-19 RX ORDER — EMPAGLIFLOZIN 10 MG/1
TABLET, FILM COATED ORAL
Qty: 90 TABLET | Refills: 1 | Status: SHIPPED | OUTPATIENT
Start: 2025-05-19

## 2025-08-08 ENCOUNTER — OFFICE VISIT (OUTPATIENT)
Facility: CLINIC | Age: 55
End: 2025-08-08
Payer: COMMERCIAL

## 2025-08-08 ENCOUNTER — OFFICE VISIT (OUTPATIENT)
Dept: PRIMARY CARE CLINIC | Facility: CLINIC | Age: 55
End: 2025-08-08
Payer: COMMERCIAL

## 2025-08-08 VITALS
WEIGHT: 270.69 LBS | SYSTOLIC BLOOD PRESSURE: 118 MMHG | OXYGEN SATURATION: 99 % | BODY MASS INDEX: 36.66 KG/M2 | HEIGHT: 72 IN | HEART RATE: 78 BPM | DIASTOLIC BLOOD PRESSURE: 78 MMHG

## 2025-08-08 DIAGNOSIS — E11.9 TYPE 2 DIABETES MELLITUS WITHOUT COMPLICATION, WITHOUT LONG-TERM CURRENT USE OF INSULIN: Primary | ICD-10-CM

## 2025-08-08 DIAGNOSIS — B35.3 TINEA PEDIS OF BOTH FEET: ICD-10-CM

## 2025-08-08 DIAGNOSIS — E11.9 TYPE 2 DIABETES MELLITUS WITHOUT COMPLICATION, WITHOUT LONG-TERM CURRENT USE OF INSULIN: ICD-10-CM

## 2025-08-08 DIAGNOSIS — G47.30 SLEEP APNEA, UNSPECIFIED TYPE: Primary | ICD-10-CM

## 2025-08-08 PROBLEM — R73.03 PREDIABETES: Status: RESOLVED | Noted: 2019-06-20 | Resolved: 2025-08-08

## 2025-08-08 PROCEDURE — 99999 PR PBB SHADOW E&M-EST. PATIENT-LVL IV: CPT | Mod: PBBFAC,,,

## 2025-08-08 RX ORDER — TIRZEPATIDE 2.5 MG/.5ML
2.5 INJECTION, SOLUTION SUBCUTANEOUS
Qty: 2 ML | Refills: 2 | Status: SHIPPED | OUTPATIENT
Start: 2025-08-08 | End: 2025-11-06

## 2025-08-08 RX ORDER — CICLOPIROX OLAMINE 7.7 MG/G
CREAM TOPICAL 2 TIMES DAILY
Qty: 15 G | Refills: 0 | Status: SHIPPED | OUTPATIENT
Start: 2025-08-08 | End: 2025-08-22

## 2025-08-08 NOTE — PROGRESS NOTES
The patient location is: [place]  The chief complaint leading to consultation is: Snoring, Apnea, and excessive daytimes sleepiness    Visit type: audiovisual    Face to Face time with patient: 11  25 minutes of total time spent on the encounter, which includes face to face time and non-face to face time preparing to see the patient (eg, review of tests), Obtaining and/or reviewing separately obtained history, Documenting clinical information in the electronic or other health record, Independently interpreting results (not separately reported) and communicating results to the patient/family/caregiver, or Care coordination (not separately reported).     Each patient to whom he or she provides medical services by telemedicine is:  (1) informed of the relationship between the physician and patient and the respective role of any other health care provider with respect to management of the patient; and (2) notified that he or she may decline to receive medical services by telemedicine and may withdraw from such care at any time.    Notes:     CC: Snoring, Apnea, and excessive daytimes sleepiness     Referred by Donnell Pollard MD for a sleep evaluation.     HPI     Subjective    HPI:  Sleep Apnea:  Symptoms:    [x] Loud snoring[x] Witnessed apneas [x] Gasping [x] Choking[] Interrupted Sleep []  Nocturia [x]  Non refreshing sleep [x] Excessive daytime sleepiness   []  Morning headaches [x] Nasal Congestion [] Dry Mouth [] Excessive Daytime Fatigue [] None  Duration:   [] Days  [] Months  [x] Years  Comments: Patient presents for concerns of possible obstructive sleep apnea. Reports loud snoring, witnessed apneas, gasping, choking, non refreshing sleep, excessive daytime sleepiness, nasal congestion (deviated septum). Recently started on mounjaro for DM2. PMH significant for Obesity, and Dyslipidemia    Wake up Feeling: [] Refreshed  [x] Non refreshed [] Occasionally Tired  Do You Take Naps: [x] Yes [] No Number of  Naps: able to doze off easily          8/8/2025     2:59 PM   EPWORTH SLEEPINESS SCALE TOTAL SCORE    Total score 20     (Validated Sleepiness Questionnaire with higher score indicating greater sleepiness (0-24)    STOP BANG Questionnaire  Patient diagnosed with Obstructive Sleep Apnea?: No  Has loud snoring: Yes  Disturbed sleep, daytime fatigue, daytime somnolence: Yes  Observed to have interrupted breathing during sleep: Yes  Takes medication for high blood pressure: No  Not taking BP medication but supposed to be: No  Recent BMI (Calculated): 36.7  Is BMI greater than 35 kg/m2?: 1=Yes  Age older than 50 years old?: 1=Yes  Has large neck size >40cm (15.7in., large male shirt size, large male collar size >16): No  Gender - Male: 1=Yes  STOP-Bang Total Score: 6  (Validated Stop Bang Questionnaire with higher score indicating greater risk of KEVIN (0-2, 3-4, 5-8)) Risk: High         No data to display                Current Sleep Medication(s): None     Previous Sleep Medication(s): None     Sleep Factors:  Sleep Environment: Cool, Dark, Comfortable, and TV on while asleep  Caffeine: 0 beverages, last beverage at  am/pm  Bed Partner:    Alcohol: social drinker  Sleep Disorder Family History: None          Objective    Records Reviewed:   Lab Results   Component Value Date    TSH 0.806 12/20/2024    CO2 27 05/09/2025    HGBA1C 6.7 (H) 05/09/2025      No echocardiogram results found for the past 12 months  Sleep Studies : None    Objective:  Vitals: There were no vitals taken for this visit.   Exam:  Gen: Well Appearing, demonstrates insight  Skin: No rash or lesions on bridge of nose or mouth          Assessment & Plan  Sleep apnea, unspecified type  Diagnostic Testing: Home Sleep Apnea Test (HST) is indicated due to comorbid conditions: Obesity and Diabetes with symptoms: snoring, gasping for air, choking , witnessed apneas, interrupted sleep, nocturia, restless sleep, non refreshing sleep, excessive daytime  sleepiness, ESS: 20/24, and excessive daytime fatigue  Education & Treatment Options:  Discussed the etiology and consequences of untreated KEVIN, including risks of cardiovascular disease, hypertension, stroke, metabolic dysfunction, and excessive daytime sleepiness.  Reviewed treatment options:  Positive Airway Pressure (PAP) therapy, Weight loss Positional therapy, Oral appliance therapy, Inspire  Lifestyle Modifications: Advised weight management, alcohol reduction, and optimizing sleep hygiene.  Safety Precautions: Recommended avoiding driving if experiencing excessive daytime sleepiness.  Next Steps:  If KEVIN is confirmed, patient agrees to trial APAP  If PAP therapy is initiated, follow-up within 31-90 days to assess compliance and effectiveness.  Results will be communicated by [employer]   Orders:    Home Sleep Study; Future    Type 2 diabetes mellitus without complication, without long-term current use of insulin  Follow up with pcp for continued management  Continue taking medications.

## 2025-08-08 NOTE — LETTER
August 8, 2025      [facility]  [address] [place] [id number]       Patient: Mingo Kahn  YOB: 1970  Date of Visit: 08/08/2025    To Whom It May Concern:    Mingo Kahn was at [facility] on 08/08/2025. He may return to work/school on 8/8/2025 with no restrictions. If you have any questions or concerns, or if I can be of further assistance, please do not hesitate to contact my office.    Sincerely,    Presley Herr LPN

## 2025-08-08 NOTE — PATIENT INSTRUCTIONS
SLEEP LAB (Jania or Isaac) will contact you to schedule the sleep study. Their number is [phone] (ext [phone]). Please call them if you do not hear from them in 2 weeks from now.  [facility] is located on 7th floor of the [facility]; [facility] is located in [facility] ( 3rd floor [facility]).    SLEEP CLINIC (my assistant) will call you when the sleep study results are ready or I will message you through the portal with the results as we have discussed - if you have not heard from us by 2 weeks from the date of the study, or you can use [facility] to contact me.    Our clinic phone number is [phone] (ext 1)       You are advised to abstain from driving should you feel sleepy or drowsy.

## 2025-08-08 NOTE — PROGRESS NOTES
Subjective:       Patient ID: Mingo Kahn is a 55 y.o. male.    Chief Complaint: diabetes follow up  HPI    Diabetes Mellitus   This is a chronic problem. Patient's last HbA1c was  6.7 %, is taking  Jardiance 10 mg daily.  He was also prescribed Mounjaro in the past, he just recently got it. He is planning to start taking it in the next coming days.    Lab Results   Component Value Date/Time    HGBA1C 6.7 (H) 05/09/2025 09:42 AM    HGBA1C 7.7 (H) 12/20/2024 09:38 AM    HGBA1C 7.0 (H) 02/27/2023 09:32 AM    HGBA1C 6.5 (H) 11/15/2022 08:17 AM    HGBA1C 6.8 (H) 08/12/2022 09:43 AM     WEIGHT MANAGEMENT:  He currently weighs between 266-270 lbs, down from 280s at the end of last year. His target weight is 245-250 lbs. He acknowledges weight loss is challenging but remains motivated to continue progress.    DIET AND EXERCISE:  He performs calisthenics at home using small weights, exercising 2-3 times per week. He acknowledges recommendation to increase exercise frequency for weight management. He reports consuming lemonade and drinking excessive sodas. He appears motivated to modify dietary intake as part of weight management strategy.    He has fungal infection and dry skin on feet. He currently uses ciclopirox  cream for treatment, which he notes is the only medication that seems effective for his condition.     ROS negative except as above  Objective:      /78 (BP Location: Right arm, Patient Position: Sitting)   Pulse 78   Ht 6' (1.829 m)   Wt 122.8 kg (270 lb 11.2 oz)   SpO2 99%   BMI 36.71 kg/m²    Physical Exam  Vitals reviewed.   Constitutional:       Appearance: Normal appearance.   HENT:      Head: Normocephalic.      Mouth/Throat:      Mouth: Mucous membranes are moist.   Cardiovascular:      Rate and Rhythm: Normal rate and regular rhythm.      Pulses: Normal pulses.      Heart sounds: Normal heart sounds.   Pulmonary:      Effort: Pulmonary effort is normal.      Breath sounds: Normal breath  sounds. No wheezing or rhonchi.   Abdominal:      General: Abdomen is flat. There is no distension.   Musculoskeletal:         General: No swelling. Normal range of motion.      Cervical back: Normal range of motion.      Comments: Protective Sensation (w/ 10 gram monofilament):  Right: Intact  Left: Intact    Visual Inspection:  Normal -  Bilateral    Pedal Pulses:   Right: Present  Left: Present    Posterior Tibialis Pulses:   Right:Present  Left: Present     Skin:     General: Skin is warm.      Coloration: Skin is not jaundiced.   Neurological:      Mental Status: He is alert.         Assessment:       1. Type 2 diabetes mellitus without complication, without long-term current use of insulin    2. Tinea pedis of both feet          Plan:       1. Type 2 diabetes mellitus without complication, without long-term current use of insulin (Primary)  Well controlled  Continue taking  Jardiance 10 mg and Mounjaro  2.5 mg weekly   Repeat blood work before next appointment  Counseled patient on diabetic diet  Counseled patient on exercise at least 150 mins/week    -  DIABETES FOOT EXAM  - tirzepatide (MOUNJARO) 2.5 mg/0.5 mL PnIj; Inject 2.5 mg into the skin every 7 days.  Dispense: 2 mL; Refill: 2  - Ambulatory referral/consult to Optometry; Future  - Hemoglobin A1C; Future  - Lipid Panel; Future    2. Tinea pedis of both feet   Condition that was improving after he started taking ciclopirox  cream,  refilled today.  - ciclopirox (LOPROX) 0.77 % Crea; Apply topically 2 (two) times daily. for 14 days  Dispense: 15 g; Refill: 0      Follow up in about 3 months (around 11/8/2025) for diabetes f/u.      Donnell Pollard M.D.    This note was generated with the assistance of ambient listening technology. Verbal consent was obtained by the patient and accompanying visitor(s) for the recording of patient appointment to facilitate this note. I attest to having reviewed and edited the generated note for accuracy, though some  syntax or spelling errors may persist. Please contact the author of this note for any clarification.     I spent a total of 30 minutes on the day of the visit.This includes face to face time and non-face to face time preparing to see the patient (eg, review of tests), obtaining and/or reviewing separately obtained history, documenting clinical information in the electronic or other health record, independently interpreting results and communicating results to the patient/family/caregiver, or care coordinator.

## 2025-08-18 ENCOUNTER — TELEPHONE (OUTPATIENT)
Dept: SLEEP MEDICINE | Facility: OTHER | Age: 55
End: 2025-08-18
Payer: COMMERCIAL